# Patient Record
Sex: MALE | Race: WHITE | NOT HISPANIC OR LATINO | Employment: OTHER | URBAN - METROPOLITAN AREA
[De-identification: names, ages, dates, MRNs, and addresses within clinical notes are randomized per-mention and may not be internally consistent; named-entity substitution may affect disease eponyms.]

---

## 2017-02-16 ENCOUNTER — COMMUNICATION - HEALTHEAST (OUTPATIENT)
Dept: LAB | Facility: CLINIC | Age: 57
End: 2017-02-16

## 2017-02-16 DIAGNOSIS — C73 PAPILLARY THYROID CARCINOMA (H): ICD-10-CM

## 2017-03-13 ENCOUNTER — AMBULATORY - HEALTHEAST (OUTPATIENT)
Dept: LAB | Facility: CLINIC | Age: 57
End: 2017-03-13

## 2017-03-13 DIAGNOSIS — C73 PAPILLARY THYROID CARCINOMA (H): ICD-10-CM

## 2017-03-20 ENCOUNTER — COMMUNICATION - HEALTHEAST (OUTPATIENT)
Dept: ENDOCRINOLOGY | Facility: CLINIC | Age: 57
End: 2017-03-20

## 2017-06-06 ENCOUNTER — AMBULATORY - HEALTHEAST (OUTPATIENT)
Dept: LAB | Facility: CLINIC | Age: 57
End: 2017-06-06

## 2017-06-06 DIAGNOSIS — C73 PAPILLARY THYROID CARCINOMA (H): ICD-10-CM

## 2017-06-13 ENCOUNTER — OFFICE VISIT - HEALTHEAST (OUTPATIENT)
Dept: ENDOCRINOLOGY | Facility: CLINIC | Age: 57
End: 2017-06-13

## 2017-06-13 DIAGNOSIS — C73 THYROID CANCER (H): ICD-10-CM

## 2017-06-13 ASSESSMENT — MIFFLIN-ST. JEOR: SCORE: 1650.41

## 2017-06-22 ENCOUNTER — HOSPITAL ENCOUNTER (OUTPATIENT)
Dept: ULTRASOUND IMAGING | Facility: HOSPITAL | Age: 57
Discharge: HOME | End: 2017-06-22
Attending: INTERNAL MEDICINE

## 2017-06-22 DIAGNOSIS — C73 THYROID CANCER (H): ICD-10-CM

## 2017-06-26 ENCOUNTER — COMMUNICATION - HEALTHEAST (OUTPATIENT)
Dept: ENDOCRINOLOGY | Facility: CLINIC | Age: 57
End: 2017-06-26

## 2017-06-26 DIAGNOSIS — E03.8 SECONDARY HYPOTHYROIDISM: ICD-10-CM

## 2017-11-29 ENCOUNTER — COMMUNICATION - HEALTHEAST (OUTPATIENT)
Dept: INTERNAL MEDICINE | Facility: CLINIC | Age: 57
End: 2017-11-29

## 2017-12-20 ENCOUNTER — COMMUNICATION - HEALTHEAST (OUTPATIENT)
Dept: ENDOCRINOLOGY | Facility: CLINIC | Age: 57
End: 2017-12-20

## 2017-12-20 DIAGNOSIS — E03.8 SECONDARY HYPOTHYROIDISM: ICD-10-CM

## 2018-01-04 ENCOUNTER — OFFICE VISIT - HEALTHEAST (OUTPATIENT)
Dept: INTERNAL MEDICINE | Facility: CLINIC | Age: 58
End: 2018-01-04

## 2018-01-04 DIAGNOSIS — Z00.00 ROUTINE GENERAL MEDICAL EXAMINATION AT A HEALTH CARE FACILITY: ICD-10-CM

## 2018-01-04 DIAGNOSIS — E78.5 HYPERLIPIDEMIA: ICD-10-CM

## 2018-01-04 DIAGNOSIS — C73 PAPILLARY THYROID CARCINOMA (H): ICD-10-CM

## 2018-01-04 DIAGNOSIS — E03.8 SECONDARY HYPOTHYROIDISM: ICD-10-CM

## 2018-01-04 DIAGNOSIS — R41.3 MEMORY CHANGES: ICD-10-CM

## 2018-01-04 DIAGNOSIS — R06.83 SNORING: ICD-10-CM

## 2018-01-04 DIAGNOSIS — E11.9 TYPE 2 DIABETES MELLITUS (H): ICD-10-CM

## 2018-01-04 LAB
ALBUMIN SERPL-MCNC: 4.3 G/DL (ref 3.5–5)
ALP SERPL-CCNC: 77 U/L (ref 45–120)
ALT SERPL W P-5'-P-CCNC: 23 U/L (ref 0–45)
ANION GAP SERPL CALCULATED.3IONS-SCNC: 13 MMOL/L (ref 5–18)
AST SERPL W P-5'-P-CCNC: 18 U/L (ref 0–40)
BILIRUB DIRECT SERPL-MCNC: 0.2 MG/DL
BILIRUB SERPL-MCNC: 0.7 MG/DL (ref 0–1)
BUN SERPL-MCNC: 15 MG/DL (ref 8–22)
CALCIUM SERPL-MCNC: 9.5 MG/DL (ref 8.5–10.5)
CHLORIDE BLD-SCNC: 101 MMOL/L (ref 98–107)
CHOLEST SERPL-MCNC: 208 MG/DL
CO2 SERPL-SCNC: 24 MMOL/L (ref 22–31)
CREAT SERPL-MCNC: 0.94 MG/DL (ref 0.7–1.3)
CREAT UR-MCNC: 50.8 MG/DL
FASTING STATUS PATIENT QL REPORTED: YES
GFR SERPL CREATININE-BSD FRML MDRD: >60 ML/MIN/1.73M2
GLUCOSE BLD-MCNC: 166 MG/DL (ref 70–125)
HBA1C MFR BLD: 7.6 % (ref 3.5–6)
HDLC SERPL-MCNC: 49 MG/DL
HGB BLD-MCNC: 14.8 G/DL (ref 14–18)
LDLC SERPL CALC-MCNC: 125 MG/DL
MICROALBUMIN UR-MCNC: <0.5 MG/DL (ref 0–1.99)
MICROALBUMIN/CREAT UR: NORMAL MG/G
POTASSIUM BLD-SCNC: 4 MMOL/L (ref 3.5–5)
PROT SERPL-MCNC: 7.7 G/DL (ref 6–8)
PSA SERPL-MCNC: 2.9 NG/ML (ref 0–3.5)
SODIUM SERPL-SCNC: 138 MMOL/L (ref 136–145)
TRIGL SERPL-MCNC: 170 MG/DL
VIT B12 SERPL-MCNC: 303 PG/ML (ref 213–816)

## 2018-01-04 ASSESSMENT — MIFFLIN-ST. JEOR: SCORE: 1632.26

## 2018-01-05 LAB — HCV AB SERPL QL IA: NEGATIVE

## 2018-01-16 ENCOUNTER — COMMUNICATION - HEALTHEAST (OUTPATIENT)
Dept: ENDOCRINOLOGY | Facility: CLINIC | Age: 58
End: 2018-01-16

## 2018-01-17 ENCOUNTER — COMMUNICATION - HEALTHEAST (OUTPATIENT)
Dept: ENDOCRINOLOGY | Facility: CLINIC | Age: 58
End: 2018-01-17

## 2018-01-17 DIAGNOSIS — E03.8 SECONDARY HYPOTHYROIDISM: ICD-10-CM

## 2018-02-01 ENCOUNTER — COMMUNICATION - HEALTHEAST (OUTPATIENT)
Dept: INTERNAL MEDICINE | Facility: CLINIC | Age: 58
End: 2018-02-01

## 2018-03-13 ENCOUNTER — COMMUNICATION - HEALTHEAST (OUTPATIENT)
Dept: INTERNAL MEDICINE | Facility: CLINIC | Age: 58
End: 2018-03-13

## 2018-03-13 DIAGNOSIS — E78.5 HYPERLIPIDEMIA: ICD-10-CM

## 2018-06-11 ENCOUNTER — COMMUNICATION - HEALTHEAST (OUTPATIENT)
Dept: INTERNAL MEDICINE | Facility: CLINIC | Age: 58
End: 2018-06-11

## 2018-06-11 DIAGNOSIS — E78.5 HYPERLIPIDEMIA: ICD-10-CM

## 2018-06-27 ENCOUNTER — COMMUNICATION - HEALTHEAST (OUTPATIENT)
Dept: INTERNAL MEDICINE | Facility: CLINIC | Age: 58
End: 2018-06-27

## 2018-07-08 ENCOUNTER — AMBULATORY - HEALTHEAST (OUTPATIENT)
Dept: INTERNAL MEDICINE | Facility: CLINIC | Age: 58
End: 2018-07-08

## 2018-07-08 DIAGNOSIS — E11.9 TYPE 2 DIABETES MELLITUS (H): ICD-10-CM

## 2018-07-31 ENCOUNTER — COMMUNICATION - HEALTHEAST (OUTPATIENT)
Dept: ENDOCRINOLOGY | Facility: CLINIC | Age: 58
End: 2018-07-31

## 2018-07-31 DIAGNOSIS — E03.8 SECONDARY HYPOTHYROIDISM: ICD-10-CM

## 2018-08-07 ENCOUNTER — AMBULATORY - HEALTHEAST (OUTPATIENT)
Dept: ENDOCRINOLOGY | Facility: CLINIC | Age: 58
End: 2018-08-07

## 2018-08-07 DIAGNOSIS — E03.8 SECONDARY HYPOTHYROIDISM: ICD-10-CM

## 2018-08-07 DIAGNOSIS — E11.9 TYPE 2 DIABETES MELLITUS (H): ICD-10-CM

## 2018-08-21 ENCOUNTER — AMBULATORY - HEALTHEAST (OUTPATIENT)
Dept: LAB | Facility: CLINIC | Age: 58
End: 2018-08-21

## 2018-08-21 DIAGNOSIS — E11.9 TYPE 2 DIABETES MELLITUS (H): ICD-10-CM

## 2018-08-21 DIAGNOSIS — E03.8 SECONDARY HYPOTHYROIDISM: ICD-10-CM

## 2018-08-21 LAB
CALCIUM SERPL-MCNC: 10.1 MG/DL (ref 8.5–10.5)
CREAT SERPL-MCNC: 1.02 MG/DL (ref 0.7–1.3)
GFR SERPL CREATININE-BSD FRML MDRD: >60 ML/MIN/1.73M2
HBA1C MFR BLD: 7.3 % (ref 3.5–6)
POTASSIUM BLD-SCNC: 4.4 MMOL/L (ref 3.5–5)
T3 SERPL-MCNC: 60 NG/DL (ref 45–175)
T4 FREE SERPL-MCNC: 1.3 NG/DL (ref 0.7–1.8)
TSH SERPL DL<=0.005 MIU/L-ACNC: 0.2 UIU/ML (ref 0.3–5)

## 2018-08-22 ENCOUNTER — AMBULATORY - HEALTHEAST (OUTPATIENT)
Dept: INTERNAL MEDICINE | Facility: CLINIC | Age: 58
End: 2018-08-22

## 2018-08-22 LAB
25(OH)D3 SERPL-MCNC: 30.6 NG/ML (ref 30–80)
25(OH)D3 SERPL-MCNC: 30.6 NG/ML (ref 30–80)
THYROGLOB AB SERPL-ACNC: <0.9 IU/ML (ref 0–4)
THYROGLOB SERPL-MCNC: ABNORMAL NG/ML (ref 1.3–31.8)
THYROGLOBULIN, SERUM OR: <0.1 NG/ML (ref 1.3–31.8)

## 2018-08-28 ENCOUNTER — OFFICE VISIT - HEALTHEAST (OUTPATIENT)
Dept: ENDOCRINOLOGY | Facility: CLINIC | Age: 58
End: 2018-08-28

## 2018-08-28 DIAGNOSIS — E11.9 TYPE 2 DIABETES MELLITUS (H): ICD-10-CM

## 2018-08-28 DIAGNOSIS — E03.8 SECONDARY HYPOTHYROIDISM: ICD-10-CM

## 2018-08-28 ASSESSMENT — MIFFLIN-ST. JEOR: SCORE: 1605.05

## 2018-11-02 ENCOUNTER — COMMUNICATION - HEALTHEAST (OUTPATIENT)
Dept: ENDOCRINOLOGY | Facility: CLINIC | Age: 58
End: 2018-11-02

## 2018-11-02 DIAGNOSIS — E03.8 SECONDARY HYPOTHYROIDISM: ICD-10-CM

## 2018-12-15 ENCOUNTER — COMMUNICATION - HEALTHEAST (OUTPATIENT)
Dept: INTERNAL MEDICINE | Facility: CLINIC | Age: 58
End: 2018-12-15

## 2018-12-15 DIAGNOSIS — E78.5 HYPERLIPIDEMIA: ICD-10-CM

## 2019-01-29 ENCOUNTER — AMBULATORY - HEALTHEAST (OUTPATIENT)
Dept: ENDOCRINOLOGY | Facility: CLINIC | Age: 59
End: 2019-01-29

## 2019-01-29 DIAGNOSIS — E03.8 SECONDARY HYPOTHYROIDISM: ICD-10-CM

## 2019-01-29 DIAGNOSIS — E11.9 TYPE 2 DIABETES MELLITUS (H): ICD-10-CM

## 2019-01-31 ENCOUNTER — COMMUNICATION - HEALTHEAST (OUTPATIENT)
Dept: ENDOCRINOLOGY | Facility: CLINIC | Age: 59
End: 2019-01-31

## 2019-01-31 DIAGNOSIS — E03.8 SECONDARY HYPOTHYROIDISM: ICD-10-CM

## 2019-02-21 ENCOUNTER — AMBULATORY - HEALTHEAST (OUTPATIENT)
Dept: LAB | Facility: CLINIC | Age: 59
End: 2019-02-21

## 2019-02-21 DIAGNOSIS — E03.8 SECONDARY HYPOTHYROIDISM: ICD-10-CM

## 2019-02-21 LAB
CREAT SERPL-MCNC: 0.91 MG/DL (ref 0.7–1.3)
GFR SERPL CREATININE-BSD FRML MDRD: >60 ML/MIN/1.73M2
POTASSIUM BLD-SCNC: 4.2 MMOL/L (ref 3.5–5)
T4 FREE SERPL-MCNC: 1.4 NG/DL (ref 0.7–1.8)
TSH SERPL DL<=0.005 MIU/L-ACNC: 0.12 UIU/ML (ref 0.3–5)

## 2019-02-23 LAB
THYROGLOB AB SERPL-ACNC: <0.9 IU/ML (ref 0–4)
THYROGLOB SERPL-MCNC: ABNORMAL NG/ML (ref 1.3–31.8)
THYROGLOBULIN, SERUM OR: <0.1 NG/ML (ref 1.3–31.8)

## 2019-02-28 ENCOUNTER — COMMUNICATION - HEALTHEAST (OUTPATIENT)
Dept: ADMINISTRATIVE | Facility: CLINIC | Age: 59
End: 2019-02-28

## 2019-03-15 ENCOUNTER — COMMUNICATION - HEALTHEAST (OUTPATIENT)
Dept: INTERNAL MEDICINE | Facility: CLINIC | Age: 59
End: 2019-03-15

## 2019-03-15 DIAGNOSIS — E78.5 HYPERLIPIDEMIA: ICD-10-CM

## 2019-03-29 ENCOUNTER — OFFICE VISIT - HEALTHEAST (OUTPATIENT)
Dept: INTERNAL MEDICINE | Facility: CLINIC | Age: 59
End: 2019-03-29

## 2019-03-29 DIAGNOSIS — E11.9 TYPE 2 DIABETES MELLITUS WITHOUT COMPLICATION, WITHOUT LONG-TERM CURRENT USE OF INSULIN (H): ICD-10-CM

## 2019-03-29 DIAGNOSIS — Z00.00 ROUTINE GENERAL MEDICAL EXAMINATION AT A HEALTH CARE FACILITY: ICD-10-CM

## 2019-03-29 DIAGNOSIS — R06.83 SNORING: ICD-10-CM

## 2019-03-29 DIAGNOSIS — C73 PAPILLARY THYROID CARCINOMA (H): ICD-10-CM

## 2019-03-29 DIAGNOSIS — E03.8 SECONDARY HYPOTHYROIDISM: ICD-10-CM

## 2019-03-29 DIAGNOSIS — R41.3 MEMORY CHANGES: ICD-10-CM

## 2019-03-29 DIAGNOSIS — E78.5 HYPERLIPIDEMIA, UNSPECIFIED HYPERLIPIDEMIA TYPE: ICD-10-CM

## 2019-03-29 DIAGNOSIS — G47.19 DAYTIME HYPERSOMNOLENCE: ICD-10-CM

## 2019-03-29 DIAGNOSIS — Z83.719 FAMILY HISTORY OF COLONIC POLYPS: ICD-10-CM

## 2019-03-29 DIAGNOSIS — Z12.5 SCREENING FOR PROSTATE CANCER: ICD-10-CM

## 2019-03-29 DIAGNOSIS — Z12.11 ENCOUNTER FOR COLORECTAL CANCER SCREENING: ICD-10-CM

## 2019-03-29 DIAGNOSIS — Z12.12 ENCOUNTER FOR COLORECTAL CANCER SCREENING: ICD-10-CM

## 2019-03-29 DIAGNOSIS — E55.9 VITAMIN D DEFICIENCY: ICD-10-CM

## 2019-03-29 DIAGNOSIS — Z51.81 MEDICATION MONITORING ENCOUNTER: ICD-10-CM

## 2019-03-29 LAB
ALBUMIN SERPL-MCNC: 4.5 G/DL (ref 3.5–5)
ALBUMIN UR-MCNC: NEGATIVE MG/DL
ALP SERPL-CCNC: 64 U/L (ref 45–120)
ALT SERPL W P-5'-P-CCNC: 17 U/L (ref 0–45)
ANION GAP SERPL CALCULATED.3IONS-SCNC: 10 MMOL/L (ref 5–18)
APPEARANCE UR: CLEAR
AST SERPL W P-5'-P-CCNC: 17 U/L (ref 0–40)
BILIRUB DIRECT SERPL-MCNC: 0.2 MG/DL
BILIRUB SERPL-MCNC: 0.6 MG/DL (ref 0–1)
BILIRUB UR QL STRIP: NEGATIVE
BUN SERPL-MCNC: 15 MG/DL (ref 8–22)
CALCIUM SERPL-MCNC: 9.4 MG/DL (ref 8.5–10.5)
CHLORIDE BLD-SCNC: 105 MMOL/L (ref 98–107)
CHOLEST SERPL-MCNC: 169 MG/DL
CO2 SERPL-SCNC: 26 MMOL/L (ref 22–31)
COLOR UR AUTO: YELLOW
CREAT SERPL-MCNC: 0.89 MG/DL (ref 0.7–1.3)
CREAT UR-MCNC: 146.7 MG/DL
FASTING STATUS PATIENT QL REPORTED: YES
GFR SERPL CREATININE-BSD FRML MDRD: >60 ML/MIN/1.73M2
GLUCOSE BLD-MCNC: 138 MG/DL (ref 70–125)
GLUCOSE UR STRIP-MCNC: ABNORMAL MG/DL
HBA1C MFR BLD: 6.5 % (ref 3.5–6)
HDLC SERPL-MCNC: 47 MG/DL
HGB BLD-MCNC: 14.8 G/DL (ref 14–18)
HGB UR QL STRIP: NEGATIVE
KETONES UR STRIP-MCNC: NEGATIVE MG/DL
LDLC SERPL CALC-MCNC: 97 MG/DL
LEUKOCYTE ESTERASE UR QL STRIP: NEGATIVE
MICROALBUMIN UR-MCNC: 0.84 MG/DL (ref 0–1.99)
MICROALBUMIN/CREAT UR: 5.7 MG/G
NITRATE UR QL: NEGATIVE
PH UR STRIP: 6.5 [PH] (ref 4.5–8)
POTASSIUM BLD-SCNC: 4.6 MMOL/L (ref 3.5–5)
PROT SERPL-MCNC: 7 G/DL (ref 6–8)
PSA SERPL-MCNC: 1.9 NG/ML (ref 0–3.5)
SODIUM SERPL-SCNC: 141 MMOL/L (ref 136–145)
SP GR UR STRIP: 1.02 (ref 1–1.03)
T4 TOTAL - HISTORICAL: 9.5 UG/DL (ref 4.5–13)
TRIGL SERPL-MCNC: 124 MG/DL
TSH SERPL DL<=0.005 MIU/L-ACNC: 0.19 UIU/ML (ref 0.3–5)
UROBILINOGEN UR STRIP-ACNC: ABNORMAL

## 2019-03-29 ASSESSMENT — MIFFLIN-ST. JEOR: SCORE: 1605.05

## 2019-04-01 ENCOUNTER — AMBULATORY - HEALTHEAST (OUTPATIENT)
Dept: INTERNAL MEDICINE | Facility: CLINIC | Age: 59
End: 2019-04-01

## 2019-04-01 DIAGNOSIS — E55.9 VITAMIN D DEFICIENCY: ICD-10-CM

## 2019-04-01 LAB
25(OH)D3 SERPL-MCNC: 19.3 NG/ML (ref 30–80)
25(OH)D3 SERPL-MCNC: 19.3 NG/ML (ref 30–80)

## 2019-04-25 ENCOUNTER — COMMUNICATION - HEALTHEAST (OUTPATIENT)
Dept: ENDOCRINOLOGY | Facility: CLINIC | Age: 59
End: 2019-04-25

## 2019-04-25 DIAGNOSIS — E03.8 SECONDARY HYPOTHYROIDISM: ICD-10-CM

## 2019-05-07 ENCOUNTER — TRANSFERRED RECORDS (OUTPATIENT)
Dept: HEALTH INFORMATION MANAGEMENT | Facility: CLINIC | Age: 59
End: 2019-05-07

## 2019-05-08 ENCOUNTER — TRANSFERRED RECORDS (OUTPATIENT)
Dept: HEALTH INFORMATION MANAGEMENT | Facility: CLINIC | Age: 59
End: 2019-05-08

## 2019-07-22 ENCOUNTER — COMMUNICATION - HEALTHEAST (OUTPATIENT)
Dept: ENDOCRINOLOGY | Facility: CLINIC | Age: 59
End: 2019-07-22

## 2019-07-22 DIAGNOSIS — E03.8 SECONDARY HYPOTHYROIDISM: ICD-10-CM

## 2019-08-24 ENCOUNTER — COMMUNICATION - HEALTHEAST (OUTPATIENT)
Dept: INTERNAL MEDICINE | Facility: CLINIC | Age: 59
End: 2019-08-24

## 2019-08-24 DIAGNOSIS — E11.9 TYPE 2 DIABETES MELLITUS WITHOUT COMPLICATION, WITHOUT LONG-TERM CURRENT USE OF INSULIN (H): ICD-10-CM

## 2020-01-20 ENCOUNTER — RECORDS - HEALTHEAST (OUTPATIENT)
Dept: ADMINISTRATIVE | Facility: OTHER | Age: 60
End: 2020-01-20

## 2020-02-04 ENCOUNTER — ANCILLARY PROCEDURE (OUTPATIENT)
Dept: ULTRASOUND IMAGING | Facility: CLINIC | Age: 60
End: 2020-02-04
Attending: INTERNAL MEDICINE
Payer: COMMERCIAL

## 2020-02-04 ENCOUNTER — RECORDS - HEALTHEAST (OUTPATIENT)
Dept: ADMINISTRATIVE | Facility: OTHER | Age: 60
End: 2020-02-04

## 2020-02-04 ENCOUNTER — OFFICE VISIT (OUTPATIENT)
Dept: ENDOCRINOLOGY | Facility: CLINIC | Age: 60
End: 2020-02-04
Payer: COMMERCIAL

## 2020-02-04 VITALS
WEIGHT: 184.8 LBS | SYSTOLIC BLOOD PRESSURE: 126 MMHG | HEART RATE: 78 BPM | OXYGEN SATURATION: 99 % | DIASTOLIC BLOOD PRESSURE: 86 MMHG

## 2020-02-04 DIAGNOSIS — C73 PAPILLARY THYROID CARCINOMA (H): ICD-10-CM

## 2020-02-04 DIAGNOSIS — C73 PAPILLARY THYROID CARCINOMA (H): Primary | ICD-10-CM

## 2020-02-04 LAB
T4 FREE SERPL-MCNC: 1.33 NG/DL (ref 0.76–1.46)
TSH SERPL DL<=0.005 MIU/L-ACNC: 0.11 MU/L (ref 0.4–4)

## 2020-02-04 PROCEDURE — 84443 ASSAY THYROID STIM HORMONE: CPT | Performed by: INTERNAL MEDICINE

## 2020-02-04 PROCEDURE — 36415 COLL VENOUS BLD VENIPUNCTURE: CPT | Performed by: INTERNAL MEDICINE

## 2020-02-04 PROCEDURE — 99204 OFFICE O/P NEW MOD 45 MIN: CPT | Performed by: INTERNAL MEDICINE

## 2020-02-04 PROCEDURE — 84432 ASSAY OF THYROGLOBULIN: CPT | Performed by: INTERNAL MEDICINE

## 2020-02-04 PROCEDURE — 84439 ASSAY OF FREE THYROXINE: CPT | Performed by: INTERNAL MEDICINE

## 2020-02-04 PROCEDURE — 76536 US EXAM OF HEAD AND NECK: CPT

## 2020-02-04 PROCEDURE — 86800 THYROGLOBULIN ANTIBODY: CPT | Performed by: INTERNAL MEDICINE

## 2020-02-04 RX ORDER — ASPIRIN 81 MG/1
81 TABLET ORAL
COMMUNITY

## 2020-02-04 RX ORDER — ROSUVASTATIN CALCIUM 10 MG/1
TABLET, COATED ORAL
COMMUNITY
Start: 2019-03-18 | End: 2021-08-17

## 2020-02-04 RX ORDER — LEVOTHYROXINE SODIUM 137 UG/1
TABLET ORAL
COMMUNITY
Start: 2019-07-22 | End: 2021-07-26

## 2020-02-04 RX ORDER — METFORMIN HCL 500 MG
500 TABLET, EXTENDED RELEASE 24 HR ORAL
COMMUNITY
Start: 2019-08-24 | End: 2021-08-03

## 2020-02-04 NOTE — PROGRESS NOTES
CC: Thyroid cancer.       HPI: Patient presents for management of thyroid cancer.   His primary care noticed a lump on exam in 2010.     FNA 4/1/10:  DIAGNOSIS:  A. RIGHT NECK MASS, FINE NEEDLE ASPIRATION:       1) BLOOD AND MACROPHAGES, RARE DEGENERATED CELLS       2) NO TUMOR SEEN       3) PLEASE SEE COMMENT.    B. CYST FLUID FROM RIGHT NECK MASS, CELL BLOCK:       1) BLOOD AND MACROPHAGES       2) NO TUMOR SEEN       3) PLEASE SEE COMMENT.    COMMENT:  Evaluation of this sample is limited, since diagnostic cyst  lining cells are not identified. The macrophages identified are  compatible with benign cyst degeneration.  However, the  possibility of an underlying neoplastic process is not entirely  excluded. Recommend clinical correlation and close follow-up. If  clinically warranted, additional sampling (e.g., open biopsy) of  this lesion near the carotid may provide additional information.    Surgery 5/14/10:  MICRO/DIAGNOSIS:  RIGHT NECK MASS, EXCISIONS IN TWO PARTS       -    LYMPH NODE TISSUE, POSITIVE FOR PAPILLARY THYROID            CARCINOMA IN BOTH SPECIMENS (2/2).    Surgery 5/21/10:  MICRO/DIAGNOSIS:  A) THYROID GLAND, RIGHT LOBE, LOBECTOMY:     DOMINANT TUMOR:       - TUMOR LATERALITY: RIGHT       - TUMOR SIZE: 1.2 CM IN GREATEST DIMENSION       - HISTOLOGIC TYPE: PAPILLARY CARCINOMA, CLASSICAL TYPE       - HISTOLOGIC GRADE: GRADE 1, WELL DIFFERENTIATED       - MARGINS: NEGATIVE FOR TUMOR, 0.1 MM TO CLOSEST INKED         CAPSULAR SURFACE MARGIN       - TUMOR CAPSULE: PARTIALLY ENCAPSULATED       - TUMOR CAPSULE INVASION: NOT IDENTIFIED       - LYMPH-VASCULAR INVASION: NOT IDENTIFIED       - PERINEURAL INVASION: NOT IDENTIFIED       - EXTRATHYROIDAL EXTENSION: NOT IDENTIFIED       SECOND TUMOR: NOT APPLICABLE       TUMOR FOCALITY: UNIFOCAL       RECEIVED: IN FORMALIN       SPECIMEN INTEGRITY: RECEIVED INTACT       SPECIMEN SIZE: SEE GROSS DESCRIPTION BELOW       SPECIMEN WEIGHT: NOT OBTAINED BY  "PROSECTOR       ADDITIONAL PATHOLOGIC FINDINGS: NONE       ANCILLARY STUDIES: NONE       CLINICAL HISTORY: AS PROVIDED BELOW       PATHOLOGIC STAGING (pTNM):       - TNM DESCRIPTORS: NOT APPLICABLE       - PRIMARY TUMOR: PT1b       - REGIONAL LYMPH NODES: TWO POSITIVE LYMPH NODES FROM PRIOR         SPECIMEN (010-1058) (2/2) AND FOUR POSITIVE LYMPH NODES OF         36 LYMPH NODES IN CURRENT TISSUES, FOUR LYMPH NODES         INVOLVED, 36 EXAMINED, PLUS 2/2 FROM ([id number]): THUS         6 POSITIVE NODES AND 38 EXAMINED WHEN INCLUDING ([id number])         pN1b       - LYMPH NODE EXTRANODAL EXTENSION: NOT IDENTIFIED       - DISTANT METASTASIS: NOT APPLICABLE.    B) THYROID GLAND, LEFT LOBE, LOBECTOMY       - BENIGN THYROID GLAND, NEGATIVE FOR TUMOR.    C) CENTRAL COMPARTMENT LYMPH NODES, EXCISION       - TWO OF THREE LYMPH NODES POSITIVE FOR THYROID CARCINOMA         (2/3)       - ASSOCIATED BENIGN THYROID TISSUE AS WELL IS HISTOLOGICALLY         IDENTIFIED.    D) MIDCERVICAL \"NODE,\" BIOPSY       - MATURE FIBROFATTY TISSUE, NEGATIVE FOR TUMOR AND NEGATIVE         FOR LYMPH NODE.    E) CERVICAL NODE, REGIONAL DISSECTION       - ONE OF NINE LYMPH NODES POSITIVE FOR PAPILLARY THYROID         CARCINOMA (1/9)       - ASSOCIATED BENIGN THYMIC TISSUE WITH FOCAL SMALL CYST.    F) RIGHT MODIFIED NECK RESECTION       - 24 LYMPH NODES IDENTIFIED, ONE POSITIVE FOR PAPILLARY         CARCINOMA (1/24).    He did receive I 131 on 7/22/2010 but the dose is not listed.     FNA left LN from 5/15/12:  LEFT NECK LYMPH NODE, ULTRASOUND-GUIDED FINE NEEDLE ASPIRATION       -    MIXED LYMPHOID INFILTRATE.       -    NO EVIDENCE OF MALIGNANCY.    He is currently taking 137 mcg of levothyroxine daily.   No palpitations, sleep disturbance, tremors.     ROS: 10 point ROS neg other than the symptoms noted above in the HPI.    PMH:   Vitamin D deficiency  Type 2 DM  Dyslipidemia   Hypothyroidism  Papillary thyroid cancer     Meds:  Current Outpatient " Medications   Medication     cholecalciferol (VITAMIN D-1000 MAX ST) 25 MCG (1000 UT) TABS     levothyroxine (SYNTHROID/LEVOTHROID) 137 MCG tablet     metFORMIN (GLUCOPHAGE-XR) 500 MG 24 hr tablet     rosuvastatin (CRESTOR) 10 MG tablet     aspirin 81 MG EC tablet     No current facility-administered medications for this visit.      FHX:   No thyroid disease.     SHX:  Non-smoker.   Retired from lumbar business.     Exam:   Vital signs:      BP: 126/86 Pulse: 78     SpO2: 99 %       Weight: 83.8 kg (184 lb 12.8 oz)  There is no height or weight on file to calculate BMI.  Gen: In NAD.   HEENT: no proptosis or lid lag, EOMI, no palpable thyroid tissue. No LAD.   Card: S1 S2 RRR no m/r/g. no LE edema.   Pulm: CTA b/l.   GI: NT ND +BS.   MSK: no gross deformities.   Derm: no rashes or lesions.   Neuro: no tremor, +2 DTR's.     A/P:   Papillary thyroid cancer - pT1b pN1b. Surgery on 5/14/10 and 5/21/10. I 131 in 7/2010. Discussed natural history and management of thyroid cancer.   TSH - 0.19 in 3/2019. No symptoms of hyperthyroidism.   -Repeat today.   Tg - Negative with negative antibodies since 2010.  -Repeat today.    US -normal in 6/2017.   -Schedule thyroid ultrasound.   -If normal, we will space these out to every 5 years.   -Call [place] radiology scheduling for your procedure:  For scheduling in the [place] ([place], [facility], and [place]) call [phone] or [phone]      For scheduling at [facility] ([facility], [facility] [place]), call [phone] or [phone]      For scheduling in the [place] ([facility], [facility]) call [phone]  or  [phone]        Tonny Basurto MD on 2/4/2020 at 10:49 AM

## 2020-02-04 NOTE — LETTER
2/4/2020         RE: Waldemar Chua  [address] [address]        Dear Colleague,    Thank you for referring your patient, Waldemar Chua, to the [facility]. Please see a copy of my visit note below.    CC: Thyroid cancer.       HPI: Patient presents for management of thyroid cancer.   His primary care noticed a lump on exam in 2010.     FNA 4/1/10:  DIAGNOSIS:  A. RIGHT NECK MASS, FINE NEEDLE ASPIRATION:       1) BLOOD AND MACROPHAGES, RARE DEGENERATED CELLS       2) NO TUMOR SEEN       3) PLEASE SEE COMMENT.    B. CYST FLUID FROM RIGHT NECK MASS, CELL BLOCK:       1) BLOOD AND MACROPHAGES       2) NO TUMOR SEEN       3) PLEASE SEE COMMENT.    COMMENT:  Evaluation of this sample is limited, since diagnostic cyst  lining cells are not identified. The macrophages identified are  compatible with benign cyst degeneration.  However, the  possibility of an underlying neoplastic process is not entirely  excluded. Recommend clinical correlation and close follow-up. If  clinically warranted, additional sampling (e.g., open biopsy) of  this lesion near the carotid may provide additional information.    Surgery 5/14/10:  MICRO/DIAGNOSIS:  RIGHT NECK MASS, EXCISIONS IN TWO PARTS       -    LYMPH NODE TISSUE, POSITIVE FOR PAPILLARY THYROID            CARCINOMA IN BOTH SPECIMENS (2/2).    Surgery 5/21/10:  MICRO/DIAGNOSIS:  A) THYROID GLAND, RIGHT LOBE, LOBECTOMY:     DOMINANT TUMOR:       - TUMOR LATERALITY: RIGHT       - TUMOR SIZE: 1.2 CM IN GREATEST DIMENSION       - HISTOLOGIC TYPE: PAPILLARY CARCINOMA, CLASSICAL TYPE       - HISTOLOGIC GRADE: GRADE 1, WELL DIFFERENTIATED       - MARGINS: NEGATIVE FOR TUMOR, 0.1 MM TO CLOSEST INKED         CAPSULAR SURFACE MARGIN       - TUMOR CAPSULE: PARTIALLY ENCAPSULATED       - TUMOR CAPSULE INVASION: NOT IDENTIFIED       - LYMPH-VASCULAR INVASION: NOT IDENTIFIED       - PERINEURAL INVASION: NOT IDENTIFIED       - EXTRATHYROIDAL EXTENSION:  "NOT IDENTIFIED       SECOND TUMOR: NOT APPLICABLE       TUMOR FOCALITY: UNIFOCAL       RECEIVED: IN FORMALIN       SPECIMEN INTEGRITY: RECEIVED INTACT       SPECIMEN SIZE: SEE GROSS DESCRIPTION BELOW       SPECIMEN WEIGHT: NOT OBTAINED BY PROSECTOR       ADDITIONAL PATHOLOGIC FINDINGS: NONE       ANCILLARY STUDIES: NONE       CLINICAL HISTORY: AS PROVIDED BELOW       PATHOLOGIC STAGING (pTNM):       - TNM DESCRIPTORS: NOT APPLICABLE       - PRIMARY TUMOR: PT1b       - REGIONAL LYMPH NODES: TWO POSITIVE LYMPH NODES FROM PRIOR         SPECIMEN (010-1058) (2/2) AND FOUR POSITIVE LYMPH NODES OF         36 LYMPH NODES IN CURRENT TISSUES, FOUR LYMPH NODES         INVOLVED, 36 EXAMINED, PLUS 2/2 FROM ([id number]): THUS         6 POSITIVE NODES AND 38 EXAMINED WHEN INCLUDING ([id number])         pN1b       - LYMPH NODE EXTRANODAL EXTENSION: NOT IDENTIFIED       - DISTANT METASTASIS: NOT APPLICABLE.    B) THYROID GLAND, LEFT LOBE, LOBECTOMY       - BENIGN THYROID GLAND, NEGATIVE FOR TUMOR.    C) CENTRAL COMPARTMENT LYMPH NODES, EXCISION       - TWO OF THREE LYMPH NODES POSITIVE FOR THYROID CARCINOMA         (2/3)       - ASSOCIATED BENIGN THYROID TISSUE AS WELL IS HISTOLOGICALLY         IDENTIFIED.    D) MIDCERVICAL \"NODE,\" BIOPSY       - MATURE FIBROFATTY TISSUE, NEGATIVE FOR TUMOR AND NEGATIVE         FOR LYMPH NODE.    E) CERVICAL NODE, REGIONAL DISSECTION       - ONE OF NINE LYMPH NODES POSITIVE FOR PAPILLARY THYROID         CARCINOMA (1/9)       - ASSOCIATED BENIGN THYMIC TISSUE WITH FOCAL SMALL CYST.    F) RIGHT MODIFIED NECK RESECTION       - 24 LYMPH NODES IDENTIFIED, ONE POSITIVE FOR PAPILLARY         CARCINOMA (1/24).    He did receive I 131 on 7/22/2010 but the dose is not listed.     FNA left LN from 5/15/12:  LEFT NECK LYMPH NODE, ULTRASOUND-GUIDED FINE NEEDLE ASPIRATION       -    MIXED LYMPHOID INFILTRATE.       -    NO EVIDENCE OF MALIGNANCY.    He is currently taking 137 mcg of levothyroxine daily.   No " palpitations, sleep disturbance, tremors.     ROS: 10 point ROS neg other than the symptoms noted above in the HPI.    PMH:   Vitamin D deficiency  Type 2 DM  Dyslipidemia   Hypothyroidism  Papillary thyroid cancer     Meds:  Current Outpatient Medications   Medication     cholecalciferol (VITAMIN D-1000 MAX ST) 25 MCG (1000 UT) TABS     levothyroxine (SYNTHROID/LEVOTHROID) 137 MCG tablet     metFORMIN (GLUCOPHAGE-XR) 500 MG 24 hr tablet     rosuvastatin (CRESTOR) 10 MG tablet     aspirin 81 MG EC tablet     No current facility-administered medications for this visit.      FHX:   No thyroid disease.     SHX:  Non-smoker.   Retired from lumbar business.     Exam:   Vital signs:      BP: 126/86 Pulse: 78     SpO2: 99 %       Weight: 83.8 kg (184 lb 12.8 oz)  There is no height or weight on file to calculate BMI.  Gen: In NAD.   HEENT: no proptosis or lid lag, EOMI, no palpable thyroid tissue. No LAD.   Card: S1 S2 RRR no m/r/g. no LE edema.   Pulm: CTA b/l.   GI: NT ND +BS.   MSK: no gross deformities.   Derm: no rashes or lesions.   Neuro: no tremor, +2 DTR's.     A/P:   Papillary thyroid cancer - pT1b pN1b. Surgery on 5/14/10 and 5/21/10. I 131 in 7/2010. Discussed natural history and management of thyroid cancer.   TSH - 0.19 in 3/2019. No symptoms of hyperthyroidism.   -Repeat today.   Tg - Negative with negative antibodies since 2010.  -Repeat today.    US -normal in 6/2017.   -Schedule thyroid ultrasound.   -If normal, we will space these out to every 5 years.   -Call [place] radiology scheduling for your procedure:  For scheduling in the [place] ([place], [facility], and [place]) call [phone] or [phone]      For scheduling at [facility] ([facility], [facility], [place]), call [phone] or [phone]      For scheduling in the South ([facility], [facility]) call [phone]  or  [phone]        Tonny Basurto MD on  2/4/2020 at 10:49 AM          Again, thank you for allowing me to participate in the care of your patient.        Sincerely,        Tonny Basurto MD

## 2020-02-04 NOTE — PATIENT INSTRUCTIONS
-Labs today.   -Schedule thyroid ultrasound.   -If normal, we will space these out to every 5 years.   -Call [place] radiology scheduling for your procedure:  For scheduling in the [place] ([place], [facility], and [place]) call [phone] or [phone]      For scheduling at [facility] ([facility], [facility], [place]), call [phone] or [phone]      For scheduling in the South ([facility]) call [phone]  or  [phone]

## 2020-02-05 DIAGNOSIS — C73 PAPILLARY THYROID CARCINOMA (H): Primary | ICD-10-CM

## 2020-02-05 LAB
THYROGLOB AB SERPL IA-ACNC: <20 IU/ML (ref 0–40)
THYROGLOB SERPL-MCNC: <0.2 NG/ML

## 2020-03-03 ENCOUNTER — COMMUNICATION - HEALTHEAST (OUTPATIENT)
Dept: INTERNAL MEDICINE | Facility: CLINIC | Age: 60
End: 2020-03-03

## 2020-03-03 DIAGNOSIS — E78.5 HYPERLIPIDEMIA: ICD-10-CM

## 2020-03-03 DIAGNOSIS — E11.9 TYPE 2 DIABETES MELLITUS WITHOUT COMPLICATION, WITHOUT LONG-TERM CURRENT USE OF INSULIN (H): ICD-10-CM

## 2020-03-11 ENCOUNTER — HEALTH MAINTENANCE LETTER (OUTPATIENT)
Age: 60
End: 2020-03-11

## 2020-05-29 ENCOUNTER — COMMUNICATION - HEALTHEAST (OUTPATIENT)
Dept: INTERNAL MEDICINE | Facility: CLINIC | Age: 60
End: 2020-05-29

## 2020-05-29 DIAGNOSIS — E78.5 HYPERLIPIDEMIA: ICD-10-CM

## 2020-07-24 ENCOUNTER — COMMUNICATION - HEALTHEAST (OUTPATIENT)
Dept: INTERNAL MEDICINE | Facility: CLINIC | Age: 60
End: 2020-07-24

## 2020-07-24 ENCOUNTER — OFFICE VISIT - HEALTHEAST (OUTPATIENT)
Dept: INTERNAL MEDICINE | Facility: CLINIC | Age: 60
End: 2020-07-24

## 2020-07-24 ENCOUNTER — AMBULATORY - HEALTHEAST (OUTPATIENT)
Dept: INTERNAL MEDICINE | Facility: CLINIC | Age: 60
End: 2020-07-24

## 2020-07-24 DIAGNOSIS — Z00.00 ROUTINE GENERAL MEDICAL EXAMINATION AT A HEALTH CARE FACILITY: ICD-10-CM

## 2020-07-24 DIAGNOSIS — E78.5 HYPERLIPIDEMIA, UNSPECIFIED HYPERLIPIDEMIA TYPE: ICD-10-CM

## 2020-07-24 DIAGNOSIS — C73 PAPILLARY THYROID CARCINOMA (H): ICD-10-CM

## 2020-07-24 DIAGNOSIS — Z80.8 FAMILY HISTORY OF MELANOMA: ICD-10-CM

## 2020-07-24 DIAGNOSIS — Z86.0100 PERSONAL HISTORY OF COLONIC POLYPS: ICD-10-CM

## 2020-07-24 DIAGNOSIS — Z12.5 SCREENING FOR PROSTATE CANCER: ICD-10-CM

## 2020-07-24 DIAGNOSIS — E11.9 TYPE 2 DIABETES MELLITUS WITHOUT COMPLICATION, WITHOUT LONG-TERM CURRENT USE OF INSULIN (H): ICD-10-CM

## 2020-07-24 DIAGNOSIS — E55.9 VITAMIN D DEFICIENCY: ICD-10-CM

## 2020-07-24 DIAGNOSIS — M25.511 CHRONIC RIGHT SHOULDER PAIN: ICD-10-CM

## 2020-07-24 DIAGNOSIS — G89.29 CHRONIC RIGHT SHOULDER PAIN: ICD-10-CM

## 2020-07-24 DIAGNOSIS — E03.8 SECONDARY HYPOTHYROIDISM: ICD-10-CM

## 2020-07-24 LAB
ALBUMIN SERPL-MCNC: 4.5 G/DL (ref 3.5–5)
ALP SERPL-CCNC: 69 U/L (ref 45–120)
ALT SERPL W P-5'-P-CCNC: 14 U/L (ref 0–45)
ANION GAP SERPL CALCULATED.3IONS-SCNC: 13 MMOL/L (ref 5–18)
AST SERPL W P-5'-P-CCNC: 15 U/L (ref 0–40)
BILIRUB SERPL-MCNC: 0.5 MG/DL (ref 0–1)
BUN SERPL-MCNC: 14 MG/DL (ref 8–22)
CALCIUM SERPL-MCNC: 9.7 MG/DL (ref 8.5–10.5)
CHLORIDE BLD-SCNC: 101 MMOL/L (ref 98–107)
CHOLEST SERPL-MCNC: 201 MG/DL
CO2 SERPL-SCNC: 26 MMOL/L (ref 22–31)
CREAT SERPL-MCNC: 0.85 MG/DL (ref 0.7–1.3)
CREAT UR-MCNC: 67.8 MG/DL
ERYTHROCYTE [DISTWIDTH] IN BLOOD BY AUTOMATED COUNT: 11.9 % (ref 11–14.5)
FASTING STATUS PATIENT QL REPORTED: YES
GFR SERPL CREATININE-BSD FRML MDRD: >60 ML/MIN/1.73M2
GLUCOSE BLD-MCNC: 118 MG/DL (ref 70–125)
HBA1C MFR BLD: 6.7 % (ref 3.5–6)
HCT VFR BLD AUTO: 44.5 % (ref 40–54)
HDLC SERPL-MCNC: 54 MG/DL
HGB BLD-MCNC: 15.1 G/DL (ref 14–18)
LDLC SERPL CALC-MCNC: 110 MG/DL
MCH RBC QN AUTO: 29.3 PG (ref 27–34)
MCHC RBC AUTO-ENTMCNC: 33.8 G/DL (ref 32–36)
MCV RBC AUTO: 87 FL (ref 80–100)
MICROALBUMIN UR-MCNC: 0.51 MG/DL (ref 0–1.99)
MICROALBUMIN/CREAT UR: 7.5 MG/G
PLATELET # BLD AUTO: 218 THOU/UL (ref 140–440)
PMV BLD AUTO: 7.6 FL (ref 7–10)
POTASSIUM BLD-SCNC: 4.3 MMOL/L (ref 3.5–5)
PROT SERPL-MCNC: 7.4 G/DL (ref 6–8)
PSA SERPL-MCNC: 1.8 NG/ML (ref 0–4.5)
RBC # BLD AUTO: 5.14 MILL/UL (ref 4.4–6.2)
SODIUM SERPL-SCNC: 140 MMOL/L (ref 136–145)
TRIGL SERPL-MCNC: 184 MG/DL
WBC: 5.1 THOU/UL (ref 4–11)

## 2020-07-24 ASSESSMENT — MIFFLIN-ST. JEOR: SCORE: 1564.22

## 2020-07-27 ENCOUNTER — COMMUNICATION - HEALTHEAST (OUTPATIENT)
Dept: ENDOCRINOLOGY | Facility: CLINIC | Age: 60
End: 2020-07-27

## 2020-07-27 DIAGNOSIS — E03.8 SECONDARY HYPOTHYROIDISM: ICD-10-CM

## 2020-07-27 LAB
25(OH)D3 SERPL-MCNC: 35.7 NG/ML (ref 30–80)
25(OH)D3 SERPL-MCNC: 35.7 NG/ML (ref 30–80)

## 2020-07-28 ENCOUNTER — AMBULATORY - HEALTHEAST (OUTPATIENT)
Dept: INTERNAL MEDICINE | Facility: CLINIC | Age: 60
End: 2020-07-28

## 2020-07-28 DIAGNOSIS — E55.9 VITAMIN D DEFICIENCY: ICD-10-CM

## 2021-01-04 ENCOUNTER — HEALTH MAINTENANCE LETTER (OUTPATIENT)
Age: 61
End: 2021-01-04

## 2021-02-22 ENCOUNTER — COMMUNICATION - HEALTHEAST (OUTPATIENT)
Dept: INTERNAL MEDICINE | Facility: CLINIC | Age: 61
End: 2021-02-22

## 2021-02-22 DIAGNOSIS — E11.9 TYPE 2 DIABETES MELLITUS WITHOUT COMPLICATION, WITHOUT LONG-TERM CURRENT USE OF INSULIN (H): ICD-10-CM

## 2021-03-11 ENCOUNTER — COMMUNICATION - HEALTHEAST (OUTPATIENT)
Dept: INTERNAL MEDICINE | Facility: CLINIC | Age: 61
End: 2021-03-11

## 2021-03-16 ENCOUNTER — AMBULATORY - HEALTHEAST (OUTPATIENT)
Dept: NURSING | Facility: CLINIC | Age: 61
End: 2021-03-16

## 2021-04-06 ENCOUNTER — AMBULATORY - HEALTHEAST (OUTPATIENT)
Dept: NURSING | Facility: CLINIC | Age: 61
End: 2021-04-06

## 2021-04-25 ENCOUNTER — HEALTH MAINTENANCE LETTER (OUTPATIENT)
Age: 61
End: 2021-04-25

## 2021-05-04 ENCOUNTER — OFFICE VISIT (OUTPATIENT)
Dept: ENDOCRINOLOGY | Facility: CLINIC | Age: 61
End: 2021-05-04
Payer: COMMERCIAL

## 2021-05-04 VITALS
RESPIRATION RATE: 14 BRPM | DIASTOLIC BLOOD PRESSURE: 91 MMHG | HEART RATE: 77 BPM | SYSTOLIC BLOOD PRESSURE: 126 MMHG | WEIGHT: 173 LBS

## 2021-05-04 DIAGNOSIS — C73 PAPILLARY THYROID CARCINOMA (H): Primary | ICD-10-CM

## 2021-05-04 LAB
T4 FREE SERPL-MCNC: 1.28 NG/DL (ref 0.76–1.46)
TSH SERPL DL<=0.005 MIU/L-ACNC: 0.13 MU/L (ref 0.4–4)

## 2021-05-04 PROCEDURE — 99214 OFFICE O/P EST MOD 30 MIN: CPT | Performed by: INTERNAL MEDICINE

## 2021-05-04 PROCEDURE — 36415 COLL VENOUS BLD VENIPUNCTURE: CPT | Performed by: INTERNAL MEDICINE

## 2021-05-04 PROCEDURE — 84443 ASSAY THYROID STIM HORMONE: CPT | Performed by: INTERNAL MEDICINE

## 2021-05-04 PROCEDURE — 84439 ASSAY OF FREE THYROXINE: CPT | Performed by: INTERNAL MEDICINE

## 2021-05-04 PROCEDURE — 84432 ASSAY OF THYROGLOBULIN: CPT | Performed by: INTERNAL MEDICINE

## 2021-05-04 PROCEDURE — 86800 THYROGLOBULIN ANTIBODY: CPT | Performed by: INTERNAL MEDICINE

## 2021-05-04 NOTE — NURSING NOTE
Chief Complaint   Patient presents with     RECHECK     Thyroid Problem       Initial BP (!) 159/91 (BP Location: Right arm, Patient Position: Sitting, Cuff Size: Adult Regular)   Pulse 80   Resp 14   Wt 78.5 kg (173 lb)  There is no height or weight on file to calculate BMI.  BP completed using cuff size: regular  Medications and allergies reviewed.      Dorie JIMÉNEZ MA

## 2021-05-04 NOTE — LETTER
5/4/2021         RE: Waldemar Chua  [address] [address]        Dear Colleague,    Thank you for referring your patient, Waldemar Chua, to the [facility]. Please see a copy of my visit note below.    S: Patient presents for management of thyroid cancer.   His primary care noticed a lump on exam in 2010.     FNA 4/1/10:  DIAGNOSIS:  A. RIGHT NECK MASS, FINE NEEDLE ASPIRATION:       1) BLOOD AND MACROPHAGES, RARE DEGENERATED CELLS       2) NO TUMOR SEEN       3) PLEASE SEE COMMENT.    B. CYST FLUID FROM RIGHT NECK MASS, CELL BLOCK:       1) BLOOD AND MACROPHAGES       2) NO TUMOR SEEN       3) PLEASE SEE COMMENT.    COMMENT:  Evaluation of this sample is limited, since diagnostic cyst  lining cells are not identified. The macrophages identified are  compatible with benign cyst degeneration.  However, the  possibility of an underlying neoplastic process is not entirely  excluded. Recommend clinical correlation and close follow-up. If  clinically warranted, additional sampling (e.g., open biopsy) of  this lesion near the carotid may provide additional information.    Surgery 5/14/10:  MICRO/DIAGNOSIS:  RIGHT NECK MASS, EXCISIONS IN TWO PARTS       -    LYMPH NODE TISSUE, POSITIVE FOR PAPILLARY THYROID            CARCINOMA IN BOTH SPECIMENS (2/2).    Surgery 5/21/10:  MICRO/DIAGNOSIS:  A) THYROID GLAND, RIGHT LOBE, LOBECTOMY:     DOMINANT TUMOR:       - TUMOR LATERALITY: RIGHT       - TUMOR SIZE: 1.2 CM IN GREATEST DIMENSION       - HISTOLOGIC TYPE: PAPILLARY CARCINOMA, CLASSICAL TYPE       - HISTOLOGIC GRADE: GRADE 1, WELL DIFFERENTIATED       - MARGINS: NEGATIVE FOR TUMOR, 0.1 MM TO CLOSEST INKED         CAPSULAR SURFACE MARGIN       - TUMOR CAPSULE: PARTIALLY ENCAPSULATED       - TUMOR CAPSULE INVASION: NOT IDENTIFIED       - LYMPH-VASCULAR INVASION: NOT IDENTIFIED       - PERINEURAL INVASION: NOT IDENTIFIED       - EXTRATHYROIDAL EXTENSION: NOT IDENTIFIED        "SECOND TUMOR: NOT APPLICABLE       TUMOR FOCALITY: UNIFOCAL       RECEIVED: IN FORMALIN       SPECIMEN INTEGRITY: RECEIVED INTACT       SPECIMEN SIZE: SEE GROSS DESCRIPTION BELOW       SPECIMEN WEIGHT: NOT OBTAINED BY PROSECTOR       ADDITIONAL PATHOLOGIC FINDINGS: NONE       ANCILLARY STUDIES: NONE       CLINICAL HISTORY: AS PROVIDED BELOW       PATHOLOGIC STAGING (pTNM):       - TNM DESCRIPTORS: NOT APPLICABLE       - PRIMARY TUMOR: PT1b       - REGIONAL LYMPH NODES: TWO POSITIVE LYMPH NODES FROM PRIOR         SPECIMEN (010-1058) (2/2) AND FOUR POSITIVE LYMPH NODES OF         36 LYMPH NODES IN CURRENT TISSUES, FOUR LYMPH NODES         INVOLVED, 36 EXAMINED, PLUS 2/2 FROM ([id number]): THUS         6 POSITIVE NODES AND 38 EXAMINED WHEN INCLUDING ([id number])         pN1b       - LYMPH NODE EXTRANODAL EXTENSION: NOT IDENTIFIED       - DISTANT METASTASIS: NOT APPLICABLE.    B) THYROID GLAND, LEFT LOBE, LOBECTOMY       - BENIGN THYROID GLAND, NEGATIVE FOR TUMOR.    C) CENTRAL COMPARTMENT LYMPH NODES, EXCISION       - TWO OF THREE LYMPH NODES POSITIVE FOR THYROID CARCINOMA         (2/3)       - ASSOCIATED BENIGN THYROID TISSUE AS WELL IS HISTOLOGICALLY         IDENTIFIED.    D) MIDCERVICAL \"NODE,\" BIOPSY       - MATURE FIBROFATTY TISSUE, NEGATIVE FOR TUMOR AND NEGATIVE         FOR LYMPH NODE.    E) CERVICAL NODE, REGIONAL DISSECTION       - ONE OF NINE LYMPH NODES POSITIVE FOR PAPILLARY THYROID         CARCINOMA (1/9)       - ASSOCIATED BENIGN THYMIC TISSUE WITH FOCAL SMALL CYST.    F) RIGHT MODIFIED NECK RESECTION       - 24 LYMPH NODES IDENTIFIED, ONE POSITIVE FOR PAPILLARY         CARCINOMA (1/24).    He did receive I 131 on 7/22/2010 but the dose is not listed.     FNA left LN from 5/15/12:  LEFT NECK LYMPH NODE, ULTRASOUND-GUIDED FINE NEEDLE ASPIRATION       -    MIXED LYMPHOID INFILTRATE.       -    NO EVIDENCE OF MALIGNANCY.    He is currently taking 137 mcg of levothyroxine daily.   No palpitations, sleep " disturbance, tremors.     He has lost 10 pounds in the last year. More active with building deck at his cabin and has been able to keep it off.     He has been vaccinated for COVID. Completed series in early 4/2021.     ROS: 10 point ROS neg other than the symptoms noted above in the HPI.    Exam:   Vital signs:      BP: (!) 159/91 Pulse: 80   Resp: 14         Weight: 78.5 kg (173 lb)  There is no height or weight on file to calculate BMI.   Vital signs:      BP: (!) 126/91 Pulse: 77   Resp: 14         Weight: 78.5 kg (173 lb)  There is no height or weight on file to calculate BMI.  Gen: In NAD.   HEENT: no proptosis or lid lag, EOMI, no palpable thyroid tissue. No LAD.   Card: S1 S2 RRR no m/r/g. no LE edema.   Pulm: CTA b/l.   MSK: no gross deformities.   Derm: no rashes or lesions.   Neuro: no tremor, +2 DTR's.     A/P:   Papillary thyroid cancer - pT1b pN1b. Surgery on 5/14/10 and 5/21/10. I 131 in 7/2010. Discussed natural history and management of thyroid cancer.   TSH - 0.19 in 3/2019. No symptoms of hyperthyroidism.   -Repeat today. No change to levothyroxine yet.   Tg - Negative with negative antibodies since 2010.  Negative with negative Ab in 2/2020.   Repeat now.   US -normal in 6/2017.   2/4/2020: The patient is status post thyroidectomy (4/1/2010) for  right sided papillary thyroid carcinoma, reportedly with lymph node  metastases.  No suspicious nodules in the thyroidectomy bed.   There are 2 small lymph nodes in the right neck. At level IV, there is  a 0.9 x 0.4 x 0.8 cm lymph node. At level V, there is a 0.9 x 0.4 x  0.7 cm lymph node. No specific worrisome imaging features. No  left-sided lymph nodes.    Recommended US in 3 months but delayed 2/2 COVID.     -Schedule ultrasound.     Tonny Basurto MD on 5/4/2021 at 1:33 PM          Again, thank you for allowing me to participate in the care of your patient.        Sincerely,        Tonny Basurto MD

## 2021-05-04 NOTE — PROGRESS NOTES
Problem: Falls - Risk of:  Goal: Will remain free from falls  Description  Will remain free from falls  9/15/2019 2253 by Chet Rogers RN  Outcome: Ongoing  Note:   Pt fall risk, fall band present, falling star, safety alarm activated and in use as needed. Hourly rounding performed. Pt encouraged to use call light. See Lissa Faustin fall risk assessment. 9/15/2019 1154 by Yasmine Coronado RN  Note:   Pt will call out with needs    Goal: Absence of physical injury  Description  Absence of physical injury  9/15/2019 2253 by Chet Rogers RN  Outcome: Ongoing  Note:   Non-skid socks in place, up with assistance, bed in lowest position, bed exit & alarm as needed, provide toileting every 2 hours an d as needed. 9/15/2019 1154 by Yasmine Coronado RN  Outcome: Met This Shift     Problem: Risk for Impaired Skin Integrity  Goal: Tissue integrity - skin and mucous membranes  Description  Structural intactness and normal physiological function of skin and  mucous membranes. Outcome: Ongoing  Note:   Continuing to monitor for skin integrity risks. Patient independent with turning/repositioning. Turning/repositioning encouraged at least once every 2 hrs, and prn basis. Hygiene care being completed independently per patient; assistance provided when deemed necessary. Problem: Fluid Volume - Deficit  Goal: Absence of fluid volume deficit signs and symptoms  Description  Absence of fluid volume deficit signs and symptoms     Outcome: Ongoing  Note:   Assessed for signs & symptoms of fluid imbalance and/or fluid loss. Assessed for signs of dehydration. Promoted fluid intake per protocol. IVF administered as ordered. S: Patient presents for management of thyroid cancer.   His primary care noticed a lump on exam in 2010.     FNA 4/1/10:  DIAGNOSIS:  A. RIGHT NECK MASS, FINE NEEDLE ASPIRATION:       1) BLOOD AND MACROPHAGES, RARE DEGENERATED CELLS       2) NO TUMOR SEEN       3) PLEASE SEE COMMENT.    B. CYST FLUID FROM RIGHT NECK MASS, CELL BLOCK:       1) BLOOD AND MACROPHAGES       2) NO TUMOR SEEN       3) PLEASE SEE COMMENT.    COMMENT:  Evaluation of this sample is limited, since diagnostic cyst  lining cells are not identified. The macrophages identified are  compatible with benign cyst degeneration.  However, the  possibility of an underlying neoplastic process is not entirely  excluded. Recommend clinical correlation and close follow-up. If  clinically warranted, additional sampling (e.g., open biopsy) of  this lesion near the carotid may provide additional information.    Surgery 5/14/10:  MICRO/DIAGNOSIS:  RIGHT NECK MASS, EXCISIONS IN TWO PARTS       -    LYMPH NODE TISSUE, POSITIVE FOR PAPILLARY THYROID            CARCINOMA IN BOTH SPECIMENS (2/2).    Surgery 5/21/10:  MICRO/DIAGNOSIS:  A) THYROID GLAND, RIGHT LOBE, LOBECTOMY:     DOMINANT TUMOR:       - TUMOR LATERALITY: RIGHT       - TUMOR SIZE: 1.2 CM IN GREATEST DIMENSION       - HISTOLOGIC TYPE: PAPILLARY CARCINOMA, CLASSICAL TYPE       - HISTOLOGIC GRADE: GRADE 1, WELL DIFFERENTIATED       - MARGINS: NEGATIVE FOR TUMOR, 0.1 MM TO CLOSEST INKED         CAPSULAR SURFACE MARGIN       - TUMOR CAPSULE: PARTIALLY ENCAPSULATED       - TUMOR CAPSULE INVASION: NOT IDENTIFIED       - LYMPH-VASCULAR INVASION: NOT IDENTIFIED       - PERINEURAL INVASION: NOT IDENTIFIED       - EXTRATHYROIDAL EXTENSION: NOT IDENTIFIED       SECOND TUMOR: NOT APPLICABLE       TUMOR FOCALITY: UNIFOCAL       RECEIVED: IN FORMALIN       SPECIMEN INTEGRITY: RECEIVED INTACT       SPECIMEN SIZE: SEE GROSS DESCRIPTION BELOW       SPECIMEN WEIGHT: NOT OBTAINED BY PROSECTOR       ADDITIONAL PATHOLOGIC  "FINDINGS: NONE       ANCILLARY STUDIES: NONE       CLINICAL HISTORY: AS PROVIDED BELOW       PATHOLOGIC STAGING (pTNM):       - TNM DESCRIPTORS: NOT APPLICABLE       - PRIMARY TUMOR: PT1b       - REGIONAL LYMPH NODES: TWO POSITIVE LYMPH NODES FROM PRIOR         SPECIMEN ([id number]) (2/2) AND FOUR POSITIVE LYMPH NODES OF         36 LYMPH NODES IN CURRENT TISSUES, FOUR LYMPH NODES         INVOLVED, 36 EXAMINED, PLUS 2/2 FROM ([id number]): THUS         6 POSITIVE NODES AND 38 EXAMINED WHEN INCLUDING ([id number])         pN1b       - LYMPH NODE EXTRANODAL EXTENSION: NOT IDENTIFIED       - DISTANT METASTASIS: NOT APPLICABLE.    B) THYROID GLAND, LEFT LOBE, LOBECTOMY       - BENIGN THYROID GLAND, NEGATIVE FOR TUMOR.    C) CENTRAL COMPARTMENT LYMPH NODES, EXCISION       - TWO OF THREE LYMPH NODES POSITIVE FOR THYROID CARCINOMA         (2/3)       - ASSOCIATED BENIGN THYROID TISSUE AS WELL IS HISTOLOGICALLY         IDENTIFIED.    D) MIDCERVICAL \"NODE,\" BIOPSY       - MATURE FIBROFATTY TISSUE, NEGATIVE FOR TUMOR AND NEGATIVE         FOR LYMPH NODE.    E) CERVICAL NODE, REGIONAL DISSECTION       - ONE OF NINE LYMPH NODES POSITIVE FOR PAPILLARY THYROID         CARCINOMA (1/9)       - ASSOCIATED BENIGN THYMIC TISSUE WITH FOCAL SMALL CYST.    F) RIGHT MODIFIED NECK RESECTION       - 24 LYMPH NODES IDENTIFIED, ONE POSITIVE FOR PAPILLARY         CARCINOMA (1/24).    He did receive I 131 on 7/22/2010 but the dose is not listed.     FNA left LN from 5/15/12:  LEFT NECK LYMPH NODE, ULTRASOUND-GUIDED FINE NEEDLE ASPIRATION       -    MIXED LYMPHOID INFILTRATE.       -    NO EVIDENCE OF MALIGNANCY.    He is currently taking 137 mcg of levothyroxine daily.   No palpitations, sleep disturbance, tremors.     He has lost 10 pounds in the last year. More active with building deck at his cabin and has been able to keep it off.     He has been vaccinated for COVID. Completed series in early 4/2021.     ROS: 10 point ROS neg other than the symptoms " noted above in the HPI.    Exam:   Vital signs:      BP: (!) 159/91 Pulse: 80   Resp: 14         Weight: 78.5 kg (173 lb)  There is no height or weight on file to calculate BMI.   Vital signs:      BP: (!) 126/91 Pulse: 77   Resp: 14         Weight: 78.5 kg (173 lb)  There is no height or weight on file to calculate BMI.  Gen: In NAD.   HEENT: no proptosis or lid lag, EOMI, no palpable thyroid tissue. No LAD.   Card: S1 S2 RRR no m/r/g. no LE edema.   Pulm: CTA b/l.   MSK: no gross deformities.   Derm: no rashes or lesions.   Neuro: no tremor, +2 DTR's.     A/P:   Papillary thyroid cancer - pT1b pN1b. Surgery on 5/14/10 and 5/21/10. I 131 in 7/2010. Discussed natural history and management of thyroid cancer.   TSH - 0.19 in 3/2019. No symptoms of hyperthyroidism.   -Repeat today. No change to levothyroxine yet.   Tg - Negative with negative antibodies since 2010.  Negative with negative Ab in 2/2020.   Repeat now.   US -normal in 6/2017.   2/4/2020: The patient is status post thyroidectomy (4/1/2010) for  right sided papillary thyroid carcinoma, reportedly with lymph node  metastases.  No suspicious nodules in the thyroidectomy bed.   There are 2 small lymph nodes in the right neck. At level IV, there is  a 0.9 x 0.4 x 0.8 cm lymph node. At level V, there is a 0.9 x 0.4 x  0.7 cm lymph node. No specific worrisome imaging features. No  left-sided lymph nodes.    Recommended US in 3 months but delayed 2/2 COVID.     -Schedule ultrasound.     Tonny Basurto MD on 5/4/2021 at 1:33 PM

## 2021-05-04 NOTE — PATIENT INSTRUCTIONS
-Labs today.   -Schedule ultrasound.   Call [facility] scheduling for your procedure:  For scheduling in the [place] ([place], [facility], and [place]) call [phone] or [phone]      For scheduling at ealth ([facility], [facility], [place]), call [phone] or [phone]      For scheduling in the [place] ([facility]) call [phone]  or  [phone]      [facility]: [phone]

## 2021-05-05 LAB — THYROGLOB AB SERPL IA-ACNC: <20 IU/ML (ref 0–40)

## 2021-05-07 LAB — THYROGLOB SERPL-MCNC: <0.2 NG/ML

## 2021-05-24 ENCOUNTER — COMMUNICATION - HEALTHEAST (OUTPATIENT)
Dept: INTERNAL MEDICINE | Facility: CLINIC | Age: 61
End: 2021-05-24

## 2021-05-24 DIAGNOSIS — E78.5 HYPERLIPIDEMIA: ICD-10-CM

## 2021-05-31 VITALS — BODY MASS INDEX: 29.1 KG/M2 | HEIGHT: 68 IN | WEIGHT: 192 LBS

## 2021-05-31 VITALS — HEIGHT: 68 IN | BODY MASS INDEX: 28.49 KG/M2 | WEIGHT: 188 LBS

## 2021-05-31 NOTE — TELEPHONE ENCOUNTER
Refill Approved    Rx renewed per Medication Renewal Policy. Medication was last renewed on 8/22/2018 with 3 refills.  Last office visit: 3/29/2019 with PCP Dr CLEMENCIA Varela, [facility] Triage/Med Refill 8/24/2019     Requested Prescriptions   Pending Prescriptions Disp Refills     metFORMIN (GLUCOPHAGE-XR) 500 MG 24 hr tablet [Pharmacy Med Name: METFORMIN HCL  MG TABLET] 90 tablet 3     Sig: TAKE 1 TABLET BY MOUTH EVERY DAY       Metformin Refill Protocol Passed - 8/24/2019 12:20 AM        Passed - Blood pressure in last 12 months     BP Readings from Last 1 Encounters:   03/29/19 112/78             Passed - LFT or AST or ALT in last 12 months     Albumin   Date Value Ref Range Status   03/29/2019 4.5 3.5 - 5.0 g/dL Final     Bilirubin, Total   Date Value Ref Range Status   03/29/2019 0.6 0.0 - 1.0 mg/dL Final     Bilirubin, Direct   Date Value Ref Range Status   03/29/2019 0.2 <=0.5 mg/dL Final     Alkaline Phosphatase   Date Value Ref Range Status   03/29/2019 64 45 - 120 U/L Final     AST   Date Value Ref Range Status   03/29/2019 17 0 - 40 U/L Final     ALT   Date Value Ref Range Status   03/29/2019 17 0 - 45 U/L Final     Protein, Total   Date Value Ref Range Status   03/29/2019 7.0 6.0 - 8.0 g/dL Final                Passed - GFR or Serum Creatinine in last 6 months     GFR MDRD Non Af Amer   Date Value Ref Range Status   03/29/2019 >60 >60 mL/min/1.73m2 Final     GFR MDRD Af Amer   Date Value Ref Range Status   03/29/2019 >60 >60 mL/min/1.73m2 Final             Passed - Visit with PCP or prescribing provider visit in last 6 months or next 3 months     Last office visit with prescriber/PCP: Visit date not found OR same dept: Visit date not found OR same specialty: Visit date not found Last physical: 3/29/2019 Last MTM visit: Visit date not found         Next appt within 3 mo: Visit date not found  Next physical within 3 mo: Visit date not found  Prescriber OR PCP: Wiley SMITH  MD Tito  Last diagnosis associated with med order: There are no diagnoses linked to this encounter.   If protocol passes may refill for 12 months if within 3 months of last provider visit (or a total of 15 months).           Passed - A1C in last 6 months     Hemoglobin A1c   Date Value Ref Range Status   03/29/2019 6.5 (H) 3.5 - 6.0 % Final               Passed - Microalbumin in last year      Microalbumin, Random Urine   Date Value Ref Range Status   03/29/2019 0.84 0.00 - 1.99 mg/dL Final

## 2021-06-02 ENCOUNTER — RECORDS - HEALTHEAST (OUTPATIENT)
Dept: ADMINISTRATIVE | Facility: CLINIC | Age: 61
End: 2021-06-02

## 2021-06-02 VITALS — HEIGHT: 68 IN | BODY MASS INDEX: 27.58 KG/M2 | WEIGHT: 182 LBS

## 2021-06-03 ENCOUNTER — RECORDS - HEALTHEAST (OUTPATIENT)
Dept: ADMINISTRATIVE | Facility: CLINIC | Age: 61
End: 2021-06-03

## 2021-06-04 VITALS
OXYGEN SATURATION: 98 % | BODY MASS INDEX: 26.22 KG/M2 | WEIGHT: 173 LBS | DIASTOLIC BLOOD PRESSURE: 80 MMHG | SYSTOLIC BLOOD PRESSURE: 128 MMHG | HEIGHT: 68 IN | HEART RATE: 66 BPM

## 2021-06-06 NOTE — TELEPHONE ENCOUNTER
RN cannot approve Refill Request    RN can NOT refill this medication Protocol failed and NO refill given.     Marla Parisi, [facility] Triage/Med Refill 3/3/2020    Requested Prescriptions   Pending Prescriptions Disp Refills     metFORMIN (GLUCOPHAGE-XR) 500 MG 24 hr tablet [Pharmacy Med Name: METFORMIN HCL  MG TABLET] 90 tablet 3     Sig: TAKE 1 TABLET BY MOUTH EVERY DAY       Metformin Refill Protocol Failed - 3/3/2020  2:09 AM        Failed - Visit with PCP or prescribing provider visit in last 6 months or next 3 months     Last office visit with prescriber/PCP: Visit date not found OR same dept: Visit date not found OR same specialty: Visit date not found Last physical: Visit date not found Last MTM visit: Visit date not found         Next appt within 3 mo: Visit date not found  Next physical within 3 mo: Visit date not found  Prescriber OR PCP: Wiley Francis MD  Last diagnosis associated with med order: 1. Hyperlipidemia  - rosuvastatin (CRESTOR) 10 MG tablet; TAKE 1 TABLET BY MOUTH EVERY DAY  Dispense: 90 tablet; Refill: 0    2. Type 2 diabetes mellitus without complication, without long-term current use of insulin (H)  - metFORMIN (GLUCOPHAGE-XR) 500 MG 24 hr tablet [Pharmacy Med Name: METFORMIN HCL  MG TABLET]; TAKE 1 TABLET BY MOUTH EVERY DAY  Dispense: 90 tablet; Refill: 1     If protocol passes may refill for 12 months if within 3 months of last provider visit (or a total of 15 months).           Failed - A1C in last 6 months     Hemoglobin A1c   Date Value Ref Range Status   03/29/2019 6.5 (H) 3.5 - 6.0 % Final               Passed - Blood pressure in last 12 months     BP Readings from Last 1 Encounters:   03/29/19 112/78             Passed - LFT or AST or ALT in last 12 months     Albumin   Date Value Ref Range Status   03/29/2019 4.5 3.5 - 5.0 g/dL Final     Bilirubin, Total   Date Value Ref Range Status   03/29/2019 0.6 0.0 - 1.0 mg/dL Final     Bilirubin, Direct   Date  Value Ref Range Status   03/29/2019 0.2 <=0.5 mg/dL Final     Alkaline Phosphatase   Date Value Ref Range Status   03/29/2019 64 45 - 120 U/L Final     AST   Date Value Ref Range Status   03/29/2019 17 0 - 40 U/L Final     ALT   Date Value Ref Range Status   03/29/2019 17 0 - 45 U/L Final     Protein, Total   Date Value Ref Range Status   03/29/2019 7.0 6.0 - 8.0 g/dL Final                Passed - GFR or Serum Creatinine in last 6 months     GFR MDRD Non Af Amer   Date Value Ref Range Status   03/29/2019 >60 >60 mL/min/1.73m2 Final     GFR MDRD Af Amer   Date Value Ref Range Status   03/29/2019 >60 >60 mL/min/1.73m2 Final             Passed - Microalbumin in last year      Microalbumin, Random Urine   Date Value Ref Range Status   03/29/2019 0.84 0.00 - 1.99 mg/dL Final                Signed Prescriptions Disp Refills    rosuvastatin (CRESTOR) 10 MG tablet 90 tablet 0     Sig: TAKE 1 TABLET BY MOUTH EVERY DAY       Statins Refill Protocol (Hmg CoA Reductase Inhibitors) Passed - 3/3/2020  2:09 AM        Passed - PCP or prescribing provider visit in past 12 months      Last office visit with prescriber/PCP: Visit date not found OR same dept: Visit date not found OR same specialty: Visit date not found  Last physical: 3/29/2019 Last MTM visit: Visit date not found   Next visit within 3 mo: Visit date not found  Next physical within 3 mo: Visit date not found  Prescriber OR PCP: Wiley Francis MD  Last diagnosis associated with med order: 1. Hyperlipidemia  - rosuvastatin (CRESTOR) 10 MG tablet; TAKE 1 TABLET BY MOUTH EVERY DAY  Dispense: 90 tablet; Refill: 0    2. Type 2 diabetes mellitus without complication, without long-term current use of insulin (H)  - metFORMIN (GLUCOPHAGE-XR) 500 MG 24 hr tablet [Pharmacy Med Name: METFORMIN HCL  MG TABLET]; TAKE 1 TABLET BY MOUTH EVERY DAY  Dispense: 90 tablet; Refill: 1    If protocol passes may refill for 12 months if within 3 months of last provider visit (or a  total of 15 months).

## 2021-06-06 NOTE — TELEPHONE ENCOUNTER
Refill Approved    Rx renewed per Medication Renewal Policy. Medication was last renewed on 3/18/19.    Marla Parisi, [facility] Triage/Med Refill 3/3/2020     Requested Prescriptions   Pending Prescriptions Disp Refills     rosuvastatin (CRESTOR) 10 MG tablet [Pharmacy Med Name: ROSUVASTATIN CALCIUM 10 MG TAB] 90 tablet 3     Sig: TAKE 1 TABLET BY MOUTH EVERY DAY       Statins Refill Protocol (Hmg CoA Reductase Inhibitors) Passed - 3/3/2020  2:09 AM        Passed - PCP or prescribing provider visit in past 12 months      Last office visit with prescriber/PCP: Visit date not found OR same dept: Visit date not found OR same specialty: Visit date not found  Last physical: 3/29/2019 Last MTM visit: Visit date not found   Next visit within 3 mo: Visit date not found  Next physical within 3 mo: Visit date not found  Prescriber OR PCP: Wiley Francis MD  Last diagnosis associated with med order: 1. Hyperlipidemia  - rosuvastatin (CRESTOR) 10 MG tablet [Pharmacy Med Name: ROSUVASTATIN CALCIUM 10 MG TAB]; TAKE 1 TABLET BY MOUTH EVERY DAY  Dispense: 90 tablet; Refill: 3    2. Type 2 diabetes mellitus without complication, without long-term current use of insulin (H)  - metFORMIN (GLUCOPHAGE-XR) 500 MG 24 hr tablet [Pharmacy Med Name: METFORMIN HCL  MG TABLET]; TAKE 1 TABLET BY MOUTH EVERY DAY  Dispense: 90 tablet; Refill: 1    If protocol passes may refill for 12 months if within 3 months of last provider visit (or a total of 15 months).             metFORMIN (GLUCOPHAGE-XR) 500 MG 24 hr tablet [Pharmacy Med Name: METFORMIN HCL  MG TABLET] 90 tablet 1     Sig: TAKE 1 TABLET BY MOUTH EVERY DAY       Metformin Refill Protocol Failed - 3/3/2020  2:09 AM        Failed - Visit with PCP or prescribing provider visit in last 6 months or next 3 months     Last office visit with prescriber/PCP: Visit date not found OR same dept: Visit date not found OR same specialty: Visit date not found Last physical: Visit  date not found Last MTM visit: Visit date not found         Next appt within 3 mo: Visit date not found  Next physical within 3 mo: Visit date not found  Prescriber OR PCP: Wiley Francis MD  Last diagnosis associated with med order: 1. Hyperlipidemia  - rosuvastatin (CRESTOR) 10 MG tablet [Pharmacy Med Name: ROSUVASTATIN CALCIUM 10 MG TAB]; TAKE 1 TABLET BY MOUTH EVERY DAY  Dispense: 90 tablet; Refill: 3    2. Type 2 diabetes mellitus without complication, without long-term current use of insulin (H)  - metFORMIN (GLUCOPHAGE-XR) 500 MG 24 hr tablet [Pharmacy Med Name: METFORMIN HCL  MG TABLET]; TAKE 1 TABLET BY MOUTH EVERY DAY  Dispense: 90 tablet; Refill: 1     If protocol passes may refill for 12 months if within 3 months of last provider visit (or a total of 15 months).           Failed - A1C in last 6 months     Hemoglobin A1c   Date Value Ref Range Status   03/29/2019 6.5 (H) 3.5 - 6.0 % Final               Passed - Blood pressure in last 12 months     BP Readings from Last 1 Encounters:   03/29/19 112/78             Passed - LFT or AST or ALT in last 12 months     Albumin   Date Value Ref Range Status   03/29/2019 4.5 3.5 - 5.0 g/dL Final     Bilirubin, Total   Date Value Ref Range Status   03/29/2019 0.6 0.0 - 1.0 mg/dL Final     Bilirubin, Direct   Date Value Ref Range Status   03/29/2019 0.2 <=0.5 mg/dL Final     Alkaline Phosphatase   Date Value Ref Range Status   03/29/2019 64 45 - 120 U/L Final     AST   Date Value Ref Range Status   03/29/2019 17 0 - 40 U/L Final     ALT   Date Value Ref Range Status   03/29/2019 17 0 - 45 U/L Final     Protein, Total   Date Value Ref Range Status   03/29/2019 7.0 6.0 - 8.0 g/dL Final                Passed - GFR or Serum Creatinine in last 6 months     GFR MDRD Non Af Amer   Date Value Ref Range Status   03/29/2019 >60 >60 mL/min/1.73m2 Final     GFR MDRD Af Amer   Date Value Ref Range Status   03/29/2019 >60 >60 mL/min/1.73m2 Final             Passed -  Microalbumin in last year      Microalbumin, Random Urine   Date Value Ref Range Status   03/29/2019 0.84 0.00 - 1.99 mg/dL Final

## 2021-06-08 ENCOUNTER — RECORDS - HEALTHEAST (OUTPATIENT)
Dept: ADMINISTRATIVE | Facility: OTHER | Age: 61
End: 2021-06-08

## 2021-06-08 NOTE — TELEPHONE ENCOUNTER
RN cannot approve Refill Request    RN can NOT refill this medication Protocol failed and NO refill given.         Marla Parisi, [facility] Triage/Med Refill 6/1/2020    Requested Prescriptions   Pending Prescriptions Disp Refills     rosuvastatin (CRESTOR) 10 MG tablet [Pharmacy Med Name: ROSUVASTATIN CALCIUM 10 MG TAB] 90 tablet 3     Sig: TAKE 1 TABLET BY MOUTH EVERY DAY       Statins Refill Protocol (Hmg CoA Reductase Inhibitors) Failed - 5/29/2020 12:27 AM        Failed - PCP or prescribing provider visit in past 12 months      Last office visit with prescriber/PCP: Visit date not found OR same dept: Visit date not found OR same specialty: Visit date not found  Last physical: 3/29/2019 Last MTM visit: Visit date not found   Next visit within 3 mo: Visit date not found  Next physical within 3 mo: Visit date not found  Prescriber OR PCP: Wiley Francis MD  Last diagnosis associated with med order: 1. Hyperlipidemia  - rosuvastatin (CRESTOR) 10 MG tablet [Pharmacy Med Name: ROSUVASTATIN CALCIUM 10 MG TAB]; TAKE 1 TABLET BY MOUTH EVERY DAY  Dispense: 30 tablet; Refill: 2    If protocol passes may refill for 12 months if within 3 months of last provider visit (or a total of 15 months).

## 2021-06-09 NOTE — TELEPHONE ENCOUNTER
Future order for Shingrix placed.  He should return in 2 to 6 months   [Hypoglycemia] : hypoglycemic [FreeTextEntry1] : 70 y.o. female with h/o Type 1 1/2 DM diagnosed in 2009, osteoporosis, and hypothyroidism here for follow up visit. No acute events since last visit. On basal bolus regimen. Does get hypoglycemia but at various times. Taking Tresiba 22 units daily because when decreased Tresiba to 20 units developed fasting hyperglycemia. Takes Humalog 5 units before breakfast, 6 to 7 units before lunch and 6 to 8 units before dinner. Does have evening snack which includes 1/2 apple with no protein and typically does not cover with insulin. No complications. UTD with opthalmology and no retinopathy. Using Freestyle Leonora on and off because concerned about accuracy. Checks FS up to 6 times per day. No foot complaints. No proteinuria. UTD with cardiology and normal stress test. Reports continued weight loss. No abdominal pain and no change in bowels. \par \par In regards to osteoporosis,  DEXA scan in July 2017 left femoral neck -2.2, total hip -2.0 and 1/3 radius -3.6 and spine -1.3 is falsely elevated. DEXA scan performed in July 2018 shows spine -1.1 which is stable with arthritis, left femoral neck -2.1 which is stable and total hip -2.0 which is stable and 1/3 radius -3.6 which is stable. Taking Alendronate 70 mg weekly since July 2017. Working with dentist and had implants in October 2019. Takes calcium 500 mg daily and vitamin D 2,000 Iu daily. No falls or fractures. \par \par In regards to hypothyroidism, takes LT4 75 mcg daily. Feeling good. Reports weight loss despite good appetite.  [___] : [unfilled]

## 2021-06-10 ENCOUNTER — HOSPITAL ENCOUNTER (OUTPATIENT)
Dept: ULTRASOUND IMAGING | Facility: HOSPITAL | Age: 61
Discharge: HOME | End: 2021-06-10
Payer: COMMERCIAL

## 2021-06-10 DIAGNOSIS — C73 PAPILLARY THYROID CARCINOMA (H): ICD-10-CM

## 2021-06-11 NOTE — PROGRESS NOTES
Progress Note    Reason for Visit:  Chief Complaint     Thyroid Problem          Progress Note:    HPI:   This pleasant 56 year old male pt is here for F/u because of thyroid cancer. He is currently on synthroid 137 mcg, 6 days a week and on the 7th day he takes half a tablet only.     Component      Latest Ref Rng & Units 3/13/2017 6/6/2017   HDL Cholesterol      >=40 mg/dL     LDL Calculated      0 - 130 mg/dL     Thyroglobulin, Tumor Marker, S      ng/mL <0.1 <0.1   Thyroglobulin Antibody, S      <4.0 IU/mL <1.8 <1.8   Thyroglobulin Interpretation       SEE BELOW SEE BELOW   TSH      0.30 - 5.00 uIU/mL 0.93 0.25 (L)   Free T4      0.7 - 1.8 ng/dL 1.1 1.1   T3, Total      45 - 175 ng/dL 74 84         Review of Systems:    Nervous System: No headache, dizziness, fainting or memory loss. No tingling sensation of hand or feet.  Ears: No hearing loss or ringing in the ears  Eyes: No blurring of vision, redness, itching or dryness.  Nose: No nosebleed or loss of smell  Mouth: No mouth sores or loss of taste  Throat: No hoarseness or difficulty swallowing  Neck: No enlarged thyroid or lymph nodes.  Heart: No chest pain, palpitation or irregular heartbeat. No swelling of hands or feet  Lungs: No shortness of breath, cough, night sweats, wheezing or hemoptysis.  Gastrointestinal: No nausea or vomiting, constipation or diarrhea.  No acid reflux, abdominal pain or blood in stools.  Kidney/Bladdr: No polyuria, polydipsia, nocturia or hematuria.  Genital/Sexual: No loss of libido  Skin: No rash, hair loss or hirsutism.  No abnormal striae  Muscles/Joints/Bones: No morning stiffness, muscle aches and pain or loss of height.    Current Medications:  Current Outpatient Prescriptions   Medication Sig     aspirin 81 MG EC tablet Take 81 mg by mouth daily.     levothyroxine (SYNTHROID, LEVOTHROID) 137 MCG tablet Take 1 tablet (137 mcg total) by mouth daily.     rosuvastatin (CRESTOR) 10 MG tablet Take 1 tablet (10 mg total) by  mouth daily.     triamcinolone (KENALOG) 0.1 % cream Apply daily as needed       Patients Active Problems:  Patient Active Problem List   Diagnosis     Type 2 diabetes mellitus     Secondary hypothyroidism     Papillary thyroid carcinoma     Hyperlipidemia     Eczema       History:   reports that he has never smoked. He does not have any smokeless tobacco history on file. He reports that he drinks alcohol.   reports that he has never smoked. He does not have any smokeless tobacco history on file. He reports that he drinks alcohol. His drug history is not on file.  History   Smoking Status     Never Smoker   Smokeless Tobacco     Not on file      reports that he has never smoked. He does not have any smokeless tobacco history on file. He reports that he drinks alcohol. His drug history is not on file.  History   Sexual Activity     Sexual activity: Not on file     Past Medical History:   Diagnosis Date     Chronic hoarseness 2012    Atypical reflux suspected     Eczema 6/21/2016     Hyperlipidemia      Papillary thyroid carcinoma     PET scan normal 2011, followed by endocrinology, FNA 2012 of inflammatory lymph node     Secondary hypothyroidism     Thyroidectomy     Type 2 diabetes mellitus 2012     Family History   Problem Relation Age of Onset     Heart disease Father      MI in 60s     Osteoporosis Father      Skin cancer Sister      Past Medical History:   Diagnosis Date     Chronic hoarseness 2012    Atypical reflux suspected     Eczema 6/21/2016     Hyperlipidemia      Papillary thyroid carcinoma     PET scan normal 2011, followed by endocrinology, FNA 2012 of inflammatory lymph node     Secondary hypothyroidism     Thyroidectomy     Type 2 diabetes mellitus 2012     Past Surgical History:   Procedure Laterality Date     COLONOSCOPY      Normal September 2010 with family history colonic polyps, repeat 5 years     FOOT SURGERY       THYROIDECTOMY  05/2010    with neck dissection for papillary thyroid cancer  "      Vitals   height is 5' 8\" (1.727 m) and weight is 192 lb (87.1 kg). His blood pressure is 104/70.         Exam  General appearance: The patient looked well, not in acute distress.  Eyes: no evidence of thyroid eye disease.   Retinal exam: No evidence of diabetic retinopathy.  Mouth and Throat: Normal  Neck: No evidence of thyromegaly, enlarged lymph node or tenderness  Chest: Trachea is central. Chest is clear to auscultation and percussion. Breat sounds are normal.  Cardiovascular exam: JVP is not raised. Heart sounds are normal, no murmurs or rub  Peripheral pulses are palpable.   Abdomen: No masses or tenderness.    Back: No vertebral tenderness or kyphosis.  Extremities: No evidence of leg edema.   Skin: Normal to touch.  No abnormal striae  Neurologic exam:  Visual fields are intact by confrontation, grossly intact. No evidence of peripheral neuropathy.  Detailed foot exam normal.        Diagnosis:  No diagnosis found.    Orders:   No orders of the defined types were placed in this encounter.        Assessment and Plan:    Thyroid cancer  tsh dropped from 0.93 to 0.25. TG tumor marker is undetectable, which is good prognosis. We will continue on that same dose.     We will do thyroid ultrasound to make sure there is no evidence of tumor reccurance.     Patient is taking crystore 10 mg once a day. He does feel some fatigue and tiredness. I did advise him to take coq 10, 100 mg daily otc.     Thyroid exam is normal. He has a scar of right radical neck dissection. Patient takes baby aspirin 81 mg. I did advise to take vitamin D 2000 units daily otc.     Patient will return to clinic in 6 months. I did spent 40 minutes with the patient. More than 40% was spent on counseling and managing his care.   "

## 2021-06-15 NOTE — PROGRESS NOTES
Office Visit - Physical   Waldemar Chua   57 y.o.  male    Date of visit: 1/4/2018  Physician: Wiley Francis MD     Assessment and Plan   1. Routine general medical examination at a health care facility  Immunizations are reviewed and everything is up-to-date.  Living will has been discussed.  Non-smoker.  Uses alcohol in moderation.  Regular exercise discussed.  Last colonoscopy was 2010 and with family history of polyps, I would recommend repeating every 5 years and will schedule.  Prostate exam is normal and I will check a PSA for prostate cancer screening.  Recommending annual eye exam.  Skin exam performed and recommending regular use of sunblock.  Hepatitis C antibody for screening.      - Hemoglobin  - PSA, Annual Screen (Prostatic-Specific Antigen)  - Hepatitis C Antibody (Anti-HCV); Future  - Ambulatory referral for Colonoscopy    2. Papillary thyroid carcinoma  Followed by endocrinology and remains on Synthroid    3. Secondary hypothyroidism  Continues on Synthroid    4. Type 2 diabetes mellitus  Recheck hemoglobin A1c and other appropriate studies.  So far has been managed with diet and exercise.  Could increase his current exercise level.  Has made changes in diet.  Diabetic foot exam normal.  Recommending annual eye exam.  - Microalbumin, Random Urine  - Glycosylated Hemoglobin A1c  - Basic Metabolic Panel    5. Hyperlipidemia  Recheck lipid profile on Crestor and monitor LFTs.  Continue aspirin 81 mg daily.  - Lipid Cascade  - Hepatic Profile    6. Snoring with memory changes  Relatively new problem discussed at today's visit.  His wife also brought up the concern.  He has noticed himself being a bit more forgetful.  He does have significant amount of stress with his work and this could be playing a role.  However, we also discussed his sleeping habits.  He is a loud snore.  He awakens feeling well rested but by the end of the day, feels exhausted.  He can also easily nap on his day off.   There is strong family history for sleep apnea with both his brother and father with the diagnosis.  We discussed that this could be contributing to some of his cognition issues during the day.  I would recommend evaluation at sleep clinic.  We also discussed the role of weight loss in management of sleep apnea if he does not wish to wear CPAP.  - Ambulatory referral to Sleep Medicine  We will also check B12 level to make sure nothing else contributing to memory changes that he is seen    Over 15 minutes was spent addressing these  new medical problems beyond time spent performing usual annual physical with over 50% of the time spent counseling and coordination of care        Return in about 1 year (around 1/4/2019) for Annual physical.     Chief Complaint   Chief Complaint   Patient presents with     Annual Exam        Patient Profile   Social History     Social History Narrative        , 4 children        Past Medical History   Patient Active Problem List   Diagnosis     Type 2 diabetes mellitus     Secondary hypothyroidism     Papillary thyroid carcinoma     Hyperlipidemia     Snoring     Memory changes       Past Surgical History  He has a past surgical history that includes Thyroidectomy (05/2010); Foot surgery; and Colonoscopy.     History of Present Illness   This 57 y.o. old male here for annual physical, to follow-up chronic medical problems and to discuss some new concerns.  He has noticed himself being somewhat more forgetful.  Usually minor issues such as applying shaving cream and then brushing his teeth after forgetting to shave.  Nothing interfering with his work.  In fact, he deals with some high stress with his work and is questioning whether this could be affecting him dealing with other issues at home.  We also discussed his sleeping habits.  He is a snorer and his wife brings up this concern as well.  He feels rested in the morning but does feel exhausted by the end of the  day and can easily take a nap on one of his days off.  Strong family history of sleep apnea with both his brother and father with the diagnosis.    Review of Systems: A comprehensive review of systems was negative except as noted.  General: No chronic fatigue, unexpected weight loss or weight gain, fevers, chills, or night sweats  Eyes: No significant change in vision.  Seeing ophthalmologist regularly.  ENT: No ear or sinus infections.  No change in hearing.  No tinnitus.  Respiratory: No wheezing, dyspnea on exertion, or chronic cough  Cardiovascular: No chest pain, palpitations, dizziness, or syncope.  No peripheral edema.  GI: No abdominal pain, reflux symptoms, dysphagia, nausea, vomiting, constipation, or diarrhea  : No change in frequency or incontinence.  No hematuria.  Skin: No new rashes or lesions.  Neurologic: No headaches, seizures, dizziness, weakness, or numbness.  Musculoskeletal: No muscle or joint pain.  Lymphatic: No swollen lymph nodes  Psychiatric: No depression, anxiety, or sleep disorder.       Medications and Allergies   Current Outpatient Prescriptions   Medication Sig Dispense Refill     aspirin 81 MG EC tablet Take 81 mg by mouth daily.       cholecalciferol, vitamin D3, 1,000 unit tablet Take 1,000 Units by mouth daily.       levothyroxine (SYNTHROID, LEVOTHROID) 137 MCG tablet Take 1 tablet (137 mcg total) by mouth daily. 90 tablet 1     rosuvastatin (CRESTOR) 10 MG tablet TAKE 1 TABLET EVERY DAY 90 tablet 0     No current facility-administered medications for this visit.      Allergies   Allergen Reactions     Pravastatin      ineffective     Simvastatin      ineffective        Family and Social History   Family History   Problem Relation Age of Onset     Colon polyps Mother      Heart disease Father      MI in 60s     Osteoporosis Father      Melanoma Sister         Social History   Substance Use Topics     Smoking status: Never Smoker     Smokeless tobacco: Never Used     Alcohol  "use Yes      Comment: weekend        Physical Exam   General Appearance:   Well-appearing middle-aged male    /80 (Patient Site: Left Arm, Patient Position: Sitting, Cuff Size: Adult Large)  Pulse 67  Ht 5' 8\" (1.727 m)  Wt 188 lb (85.3 kg)  SpO2 98%  BMI 28.59 kg/m2    EYES: Eyelids, conjunctiva, and sclera were normal. Pupils were normal. Cornea, iris, and lens were normal bilaterally.  HEAD, EARS, NOSE, MOUTH, AND THROAT: Head and face were normal. Nose appearance was normal and there was no discharge. Oropharynx was normal.  NECK: Neck appearance was normal. There were no neck masses and the thyroid was not enlarged and no nodules are felt.  No lymphadenopathy.  RESPIRATORY: Breathing pattern was normal and the chest moved symmetrically.  Percussion/auscultatory percussion was normal.  Lung sounds were normal and there were no rales or wheezes.  CARDIOVASCULAR: Heart rate and rhythm were normal.  S1 and S2 were normal and there were no extra sounds or murmurs. Peripheral pulses in arms and legs were normal.  Jugular venous pressure was normal.  There was no peripheral edema.  No carotid bruits.  GASTROINTESTINAL: The abdomen was normal in contour.  Bowel sounds were present.  Percussion detected no organ enlargement or tenderness.  Palpation detected no tenderness, mass, or enlarged organs.   RECTAL/PROSTATE: No external lesions.  Sphincter tone normal.  No palpable rectal lesions.  Prostate normal size, smooth, nontender without palpable lesions.  MUSCULOSKELETAL: Skeletal configuration was normal and muscle mass was normal for age. Joint appearance was overall normal.  LYMPHATIC: There were no enlarged nodes.  SKIN/HAIR/NAILS: Skin color was normal.  There were no skin lesions.  Hair and nails were normal.  NEUROLOGIC: The patient was alert and oriented to person, place, time, and circumstance. Speech was normal. Cranial nerves were normal. Motor strength was normal for age. The patient was normally " coordinated.  Sensation intact.  Diabetic foot exam: Normal vibratory sensation, good pedal pulses and no other abnormality  PSYCHIATRIC:  Mood and affect were normal and the patient had normal recent and remote memory. The patient's judgment and insight were normal.       Additional Information        Wiley Francis MD  Internal Medicine  Contact me at [phone]

## 2021-06-16 PROBLEM — Z80.8 FAMILY HISTORY OF MELANOMA: Status: ACTIVE | Noted: 2020-07-24

## 2021-06-16 PROBLEM — R06.83 SNORING: Status: ACTIVE | Noted: 2018-01-04

## 2021-06-16 PROBLEM — E11.9 TYPE 2 DIABETES MELLITUS WITHOUT COMPLICATION, WITHOUT LONG-TERM CURRENT USE OF INSULIN (H): Status: ACTIVE | Noted: 2019-03-29

## 2021-06-16 PROBLEM — E55.9 VITAMIN D DEFICIENCY: Status: ACTIVE | Noted: 2019-03-29

## 2021-06-16 PROBLEM — M25.511 CHRONIC RIGHT SHOULDER PAIN: Status: ACTIVE | Noted: 2020-07-24

## 2021-06-16 PROBLEM — G89.29 CHRONIC RIGHT SHOULDER PAIN: Status: ACTIVE | Noted: 2020-07-24

## 2021-06-16 PROBLEM — Z86.0100 PERSONAL HISTORY OF COLONIC POLYPS: Status: ACTIVE | Noted: 2020-07-24

## 2021-06-17 NOTE — TELEPHONE ENCOUNTER
Refill Request  Medication name: rosuvastatin (CRESTOR) 10 MG tablet  Requested Prescriptions     Pending Prescriptions Disp Refills     rosuvastatin (CRESTOR) 10 MG tablet 90 tablet 3     Sig: Take 1 tablet (10 mg total) by mouth daily.     Who prescribed the medication: PCP  Last refill on medication: 6/1/2020  Requested Pharmacy: [facility]  Last appointment with PCP: 3/11/21  Next appointment: Not due    RT Hussain (R)

## 2021-06-17 NOTE — PATIENT INSTRUCTIONS - HE
Patient Instructions by Wiley Francis MD at 3/29/2019  8:40 AM     Author: Wiley Francis MD Service: -- Author Type: Physician    Filed: 3/29/2019  9:37 AM Encounter Date: 3/29/2019 Status: Addendum    : Wiley Francis MD (Physician)    Related Notes: Original Note by Wiley Francis MD (Physician) filed at 3/29/2019  9:36 AM           Firearm Safety  From 2005 to 2014, roughly 20,000 American minors were killed or seriously injured in accidental shootings; the majority of those killed in these tragic accidents were aged 12 or younger. As such the American Academy of Pediatrics, American Academy of Family Physicians, American College of Physicians and the National Rifle Association all recommend preventing unintentional use of firearms by securely storing firearms in locked safe or at minimum a trigger lock.  Patient Education   Understanding [employer] MyPlate  The USDA (US Department of Agriculture) has guidelines to help you make healthy food choices. These are called MyPlate. MyPlate shows the food groups that make up healthy meals using the image of a place setting. Before you eat, think about the healthiest choices for what to put onto your plate or into your cup or bowl. To learn more about building a healthy plate, visit www.choosemyplate.gov.       The Food Groups    Fruits: Any fruit or 100% fruit juice counts as part of the Fruit Group. Fruits may be fresh, canned, frozen, or dried, and may be whole, cut-up, or pureed. Make half your plate fruits and vegetables.    Vegetables: Any vegetable or 100% vegetable juice counts as a member of the Vegetable Group. Vegetables may be fresh, frozen, canned, or dried. They can be served raw or cooked and may be whole, cut-up, or mashed. Make half your plate fruits and vegetables.     Grains: All foods made from grains are part of the Grains Group. These include wheat, rice, oats, cornmeal, and barley such as bread, pasta, oatmeal, cereal,  tortillas, and grits. Grains should be no more than a quarter of your plate. At least half of your grains should be whole grains.    Protein: This group includes meat, poultry, seafood, beans and peas, eggs, processed soy products (like tofu), nuts (including nut butters), and seeds. Make protein choices no more than a quarter of your plate. Meat and poultry choices should be lean or low fat.    Dairy: All fluid milk products and foods made from milk that contain calcium, like yogurt and cheese are part of the Dairy Group. (Foods that have little calcium, such as cream, butter, and cream cheese, are not part of the group.) Most dairy choices should be low-fat or fat-free.    Oils: These are fats that are liquid at room temperature. They include canola, corn, olive, soybean, and sunflower oil. Foods that are mainly oil include mayonnaise, certain salad dressings, and soft margarines. You should have only 5 to 7 teaspoons of oils a day. You probably already get this much from the food you eat.  Use [employer] to Help Build Your Meals  The [employer] can help you plan and track your meals and activity. You can look up individual foods to see or compare their nutritional value. You can get guidelines for what and how much you should eat. You can compare your food choices. And you can assess personal physical activities and see ways you can improve. Go to www.[employer].gov/supertracker/.    1887-2211 The [employer]. [address] [place] [address], [place] [address]. All rights reserved. This information is not intended as a substitute for professional medical care. Always follow your healthcare professional's instructions.        Recommending new shingles vaccine, Shingrix.  Check with your insurance  Schedule colonoscopy for colon cancer screening  Continue annual flu shots  Schedule annual eye exam           Strong peripheral pulses

## 2021-06-18 NOTE — PATIENT INSTRUCTIONS - HE
Patient Instructions by Wiley Francis MD at 7/24/2020  2:20 PM     Author: Wiley Francis MD Service: -- Author Type: Physician    Filed: 7/24/2020  2:55 PM Encounter Date: 7/24/2020 Status: Addendum    : Wiley Francis MD (Physician)    Related Notes: Original Note by Wiley Francis MD (Physician) filed at 7/24/2020  2:54 PM         Patient Education   Understanding USDA MyPlate  The USDA (US Department of Agriculture) has guidelines to help you make healthy food choices. These are called MyPlate. MyPlate shows the food groups that make up healthy meals using the image of a place setting. Before you eat, think about the healthiest choices for what to put onto your plate or into your cup or bowl. To learn more about building a healthy plate, visit www.choosemyplate.gov.       The Food Groups    Fruits: Any fruit or 100% fruit juice counts as part of the Fruit Group. Fruits may be fresh, canned, frozen, or dried, and may be whole, cut-up, or pureed. Make half your plate fruits and vegetables.    Vegetables: Any vegetable or 100% vegetable juice counts as a member of the Vegetable Group. Vegetables may be fresh, frozen, canned, or dried. They can be served raw or cooked and may be whole, cut-up, or mashed. Make half your plate fruits and vegetables.     Grains: All foods made from grains are part of the Grains Group. These include wheat, rice, oats, cornmeal, and barley such as bread, pasta, oatmeal, cereal, tortillas, and grits. Grains should be no more than a quarter of your plate. At least half of your grains should be whole grains.    Protein: This group includes meat, poultry, seafood, beans and peas, eggs, processed soy products (like tofu), nuts (including nut butters), and seeds. Make protein choices no more than a quarter of your plate. Meat and poultry choices should be lean or low fat.    Dairy: All fluid milk products and foods made from milk that contain calcium, like  yogurt and cheese are part of the Dairy Group. (Foods that have little calcium, such as cream, butter, and cream cheese, are not part of the group.) Most dairy choices should be low-fat or fat-free.    Oils: These are fats that are liquid at room temperature. They include canola, corn, olive, soybean, and sunflower oil. Foods that are mainly oil include mayonnaise, certain salad dressings, and soft margarines. You should have only 5 to 7 teaspoons of oils a day. You probably already get this much from the food you eat.  Use [employer] to Help Build Your Meals  The [employer] can help you plan and track your meals and activity. You can look up individual foods to see or compare their nutritional value. You can get guidelines for what and how much you should eat. You can compare your food choices. And you can assess personal physical activities and see ways you can improve. Go to www.[employer].gov/[employer]cker/.    6758-2000 [employer]. [address] [place] [address], [place] [id number]. All rights reserved. This information is not intended as a substitute for professional medical care. Always follow your healthcare professional's instructions.        Healthcare maintenance    Return in 2 to 6 months to get your second Shingrix vaccine completed    Recommending annual flu shot every fall    Repeat colonoscopy in 2024    Continue to see your eye doctor every year    With family history of melanoma, I would recommend seeing a dermatologist every 1 to 2 years

## 2021-06-20 ENCOUNTER — HEALTH MAINTENANCE LETTER (OUTPATIENT)
Age: 61
End: 2021-06-20

## 2021-06-20 NOTE — PROGRESS NOTES
Progress Note    Reason for Visit:  Chief Complaint     Thyroid Problem          Progress Note:    HPI:     This patient is here for follow-up because of thyroid cancer.      Component      Latest Ref Rng & Units 1/4/2018 8/21/2018   Microalbumin, Random Urine      0.00 - 1.99 mg/dL <0.50    Creatinine, Urine      mg/dL 50.8    Microalbumin/Creatinine Ratio Random Urine      <=19.9 mg/g     Creatinine      0.70 - 1.30 mg/dL  1.02   GFR MDRD Af Amer      >60 mL/min/1.73m2  >60   GFR MDRD Non Af Amer      >60 mL/min/1.73m2  >60   Thyroglobulin Antibody      0.0 - 4.0 IU/mL  <0.9   Thyroglobulin, Serum or Plasma      1.3 - 31.8 ng/mL  <0.1 (L)   Thyroglobulin by LC-MS/MS, Serum/Plasma      1.3 - 31.8 ng/mL  Not Applicable   Free T4      0.7 - 1.8 ng/dL  1.3   TSH      0.30 - 5.00 uIU/mL  0.20 (L)   Hemoglobin A1c      3.5 - 6.0 % 7.6 (H) 7.3 (H)   T3, Total      45 - 175 ng/dL  60   Vitamin B-12      213 - 816 pg/mL 303    PSA      0.0 - 3.5 ng/mL 2.9    Potassium      3.5 - 5.0 mmol/L  4.4   Calcium      8.5 - 10.5 mg/dL  10.1   Vitamin D, Total (25-Hydroxy)      30.0 - 80.0 ng/mL  30.6     US THYROID  6/22/2017 11:44 AM     INDICATION: Papillary thyroid cancer, status post thyroidectomy and radioiodine ablation.  TECHNIQUE: Routine.  COMPARISON: 5/5/2012     FINDINGS: Thyroidectomy. No soft tissue nodules in the thyroidectomy bed. No cervical lymphadenopathy.       Review of Systems:    Nervous System: No headache, dizziness, fainting or memory loss. No tingling sensation of hand or feet.  Ears: No hearing loss or ringing in the ears  Eyes: No blurring of vision, redness, itching or dryness.  Nose: No nosebleed or loss of smell  Mouth: No mouth sores or loss of taste  Throat: No hoarseness or difficulty swallowing  Neck: No enlarged thyroid or lymph nodes.  Heart: No chest pain, palpitation or irregular heartbeat. No swelling of hands or feet  Lungs: No shortness of breath, cough, night sweats, wheezing or  hemoptysis.  Gastrointestinal: No nausea or vomiting, constipation or diarrhea.  No acid reflux, abdominal pain or blood in stools.  Kidney/Bladdr: No polyuria, polydipsia, nocturia or hematuria.  Genital/Sexual: No loss of libido  Skin: No rash, hair loss or hirsutism.  No abnormal striae  Muscles/Joints/Bones: No morning stiffness, muscle aches and pain or loss of height.    Current Medications:  Current Outpatient Prescriptions   Medication Sig     aspirin 81 MG EC tablet Take 81 mg by mouth daily.     cholecalciferol, vitamin D3, 1,000 unit tablet Take 1,000 Units by mouth daily.     levothyroxine (SYNTHROID, LEVOTHROID) 137 MCG tablet TAKE 1 TABLET BY MOUTH EVERY DAY     metFORMIN (GLUCOPHAGE XR) 500 MG 24 hr tablet Take 1 tablet (500 mg total) by mouth daily.     rosuvastatin (CRESTOR) 10 MG tablet TAKE 1 TABLET BY MOUTH EVERY DAY       Patients Active Problems:  Patient Active Problem List   Diagnosis     Type 2 diabetes mellitus (H)     Secondary hypothyroidism     Papillary thyroid carcinoma (H)     Hyperlipidemia     Snoring     Memory changes       History:   reports that he has never smoked. He has never used smokeless tobacco. He reports that he drinks alcohol.   reports that he has never smoked. He has never used smokeless tobacco. He reports that he drinks alcohol. His drug history is not on file.  History   Smoking Status     Never Smoker   Smokeless Tobacco     Never Used      reports that he has never smoked. He has never used smokeless tobacco. He reports that he drinks alcohol. His drug history is not on file.  History   Sexual Activity     Sexual activity: Not on file     Past Medical History:   Diagnosis Date     Chronic hoarseness 2012    Atypical reflux suspected     Eczema 6/21/2016     Hyperlipidemia      Memory changes 1/4/2018     Papillary thyroid carcinoma (H)     PET scan normal 2011, followed by endocrinology, FNA 2012 of inflammatory lymph node     Secondary hypothyroidism      "Thyroidectomy     Type 2 diabetes mellitus (H) 2012     Family History   Problem Relation Age of Onset     Colon polyps Mother      Heart disease Father      MI in 60s     Osteoporosis Father      Melanoma Sister      Past Medical History:   Diagnosis Date     Chronic hoarseness 2012    Atypical reflux suspected     Eczema 6/21/2016     Hyperlipidemia      Memory changes 1/4/2018     Papillary thyroid carcinoma (H)     PET scan normal 2011, followed by endocrinology, FNA 2012 of inflammatory lymph node     Secondary hypothyroidism     Thyroidectomy     Type 2 diabetes mellitus (H) 2012     Past Surgical History:   Procedure Laterality Date     COLONOSCOPY      Normal September 2010 with family history colonic polyps, repeat 5 years     FOOT SURGERY       THYROIDECTOMY  05/2010    with neck dissection for papillary thyroid cancer       Vitals   height is 5' 8\" (1.727 m) and weight is 182 lb (82.6 kg). His blood pressure is 136/84.         Exam  General appearance: The patient looked well, not in acute distress.  Eyes: no evidence of thyroid eye disease.   Retinal exam: No evidence of diabetic retinopathy.  Mouth and Throat: Normal  Neck: No evidence of thyromegaly, enlarged lymph node or tenderness  Chest: Trachea is central. Chest is clear to auscultation and percussion. Breat sounds are normal.  Cardiovascular exam: JVP is not raised. Heart sounds are normal, no murmurs or rub  Peripheral pulses are palpable.   Abdomen: No masses or tenderness.    Back: No vertebral tenderness or kyphosis.  Extremities: No evidence of leg edema.   Skin: Normal to touch.  No abnormal striae  Neurologic exam:  Visual fields are intact by confrontation, grossly intact. No evidence of peripheral neuropathy.  Detailed foot exam normal.        Diagnosis:  No diagnosis found.    Orders:   No orders of the defined types were placed in this encounter.        Assessment and Plan: Thyroid cancer.  Patient is currently on Synthroid 137 mcg " daily.  He has good energy level TSH 0.2 free T4 1.3.  He occasionally forgets his medicine once a week.    Thyroglobulin tumor marker is less than 0.1 thyroglobulin antibodies is not detectable.    I discussed with the patient is good prognosis.  We did thyroid ultrasound last year and it was fine will do another one 6 month.  Thyroid exam is normal.    He takes aspirin 81 mg vitamin D 1000 units a day his vitamin D is adequate at 30.6 calcium 10.1.    He is a newly diagnosed diabetes his A1c is 7.3.  He just he is about to start metformin 500 I did advise him to gradually increase the metformin until he is taken 4 a day.    His PSA has been going up 1.7-2.9.  I did advise him to follow-up with primary care physician regarding that.  Patient will return to clinic in 6 months    I have reviewed and ordered clinical lab test    I have reviewed and ordered radiology tests.    I have reviewed and ordered her medication as required.    I have reviewed her test results and advised with the performing physician.    I have reviewed the patient's old records.    I have reviewed and summarized the patient old records.    I did spend 40 minutes with the patient more than 50% was spent on counseling and managing her care.

## 2021-06-25 NOTE — TELEPHONE ENCOUNTER
Refill Approved    Rx renewed per Medication Renewal Policy. Medication was last renewed on 6/1/20.    David Jaramillo, [facility] Connection Triage/Med Refill 5/25/2021     Requested Prescriptions   Pending Prescriptions Disp Refills     rosuvastatin (CRESTOR) 10 MG tablet 90 tablet 3     Sig: Take 1 tablet (10 mg total) by mouth daily.       Statins Refill Protocol (Hmg CoA Reductase Inhibitors) Passed - 5/24/2021  5:41 PM        Passed - PCP or prescribing provider visit in past 12 months      Last office visit with prescriber/PCP: Visit date not found OR same dept: Visit date not found OR same specialty: Visit date not found  Last physical: 7/24/2020 Last MTM visit: Visit date not found   Next visit within 3 mo: Visit date not found  Next physical within 3 mo: Visit date not found  Prescriber OR PCP: Wiley Francis MD  Last diagnosis associated with med order: 1. Hyperlipidemia  - rosuvastatin (CRESTOR) 10 MG tablet; Take 1 tablet (10 mg total) by mouth daily.  Dispense: 90 tablet; Refill: 3    If protocol passes may refill for 12 months if within 3 months of last provider visit (or a total of 15 months).

## 2021-06-25 NOTE — TELEPHONE ENCOUNTER
RN cannot approve Refill Request    RN can NOT refill this medication PCP messaged that patient is overdue for Office Visit       Marla Parisi, [facility] Triage/Med Refill 3/18/2019    Requested Prescriptions   Pending Prescriptions Disp Refills     rosuvastatin (CRESTOR) 10 MG tablet [Pharmacy Med Name: ROSUVASTATIN CALCIUM 10 MG TAB] 90 tablet 3     Sig: TAKE 1 TABLET BY MOUTH EVERY DAY    Statins Refill Protocol (Hmg CoA Reductase Inhibitors) Failed - 3/15/2019  1:40 AM       Failed - PCP or prescribing provider visit in past 12 months     Last office visit with prescriber/PCP: Visit date not found OR same dept: Visit date not found OR same specialty: Visit date not found  Last physical: 1/4/2018 Last MTM visit: Visit date not found   Next visit within 3 mo: Visit date not found  Next physical within 3 mo: Visit date not found  Prescriber OR PCP: Wiley Francis MD  Last diagnosis associated with med order: 1. Hyperlipidemia  - rosuvastatin (CRESTOR) 10 MG tablet [Pharmacy Med Name: ROSUVASTATIN CALCIUM 10 MG TAB]; TAKE 1 TABLET BY MOUTH EVERY DAY  Dispense: 90 tablet; Refill: 0    If protocol passes may refill for 12 months if within 3 months of last provider visit (or a total of 15 months).

## 2021-06-27 NOTE — PROGRESS NOTES
Progress Notes by Wiley Francis MD at 3/29/2019  8:40 AM     Author: Wiley Francis MD Service: -- Author Type: Physician    Filed: 3/29/2019  4:48 PM Encounter Date: 3/29/2019 Status: Signed    : Wiley Francis MD (Physician)       MALE PREVENTATIVE EXAM    Assessment and Plan:       1. Routine general medical examination at a health care facility  Immunizations are reviewed and recommending Shingrix along with annual flu shot.  Will update tetanus.  Living will has been discussed.  Non-smoker.  Uses alcohol in moderation.  Regular exercise discussed.  Up to date with colonoscopies and this should be repeated this year.  Prostate exam is normal and I will check a PSA for prostate cancer screening.    Recommending annual eye exam.  Skin exam performed and recommending regular use of sunblock.  Hepatitis C antibody for screening was normal.     - Hemoglobin    2. Vitamin D deficiency  Vitamin D level was borderline low last year.  Forgot to refill vitamin D and currently not taking.  - cholecalciferol, vitamin D3, 1,000 unit tablet; Take 1 tablet (1,000 Units total) by mouth daily.; Refill: 0    3. Type 2 diabetes mellitus without complication, without long-term current use of insulin (H)  Started Metformin  mg daily last year his A1c was persistently over 7%.  Currently not monitoring sugars at home.  Will recheck A1c.  If still not at goal, I would continue efforts at regular exercise and would increase dose to 1000 mg daily.  I will also get him a glucose monitor and appropriate supplies.  Recommending annual eye exam and we discussed the risk for retinopathy.  Diabetic foot exam completed at today's visit.  Obtain microalbumin.  - Basic Metabolic Panel  - Urinalysis  - Vitamin D, Total (25-Hydroxy)  - Glycosylated Hemoglobin A1c    4. Secondary hypothyroidism with history of papillary thyroid carcinoma and thyroidectomy  Remains on levothyroxine.  Followed by endocrinology  -  Thyroid Stimulating Hormone (TSH)  - T4, Total        6. Memory changes  Symptoms are not concerning for early dementia.  He will forget to do something or cannot remember why he entered a room to do something but eventually will recall.  I suspect stress in his life related to work is playing a role.  Less likely, could be related to undiagnosed sleep apnea which is discussed below.  We will continue to monitor    7. Hyperlipidemia, unspecified hyperlipidemia type  Remains on rosuvastatin.  Continue aspirin 81 mg daily  - Lipid Cascade    8. Snoring with daytime hypersomnolence.  He can easily fall asleep at the end of the day.  There is a strong family history for sleep apnea.  I suspect sleep apnea may be present.  We discussed the role of weight loss and avoiding sleeping on back.  However, I think he would benefit from evaluation at sleep clinic and he will consider.  He is reluctant to wear CPAP.        10. Family history of colonic polyps  He should have a colonoscopy every 5 years and will get this scheduled    11. Medication monitoring encounter  Monitor liver studies while on statin  - Hepatic Profile    12. Screening for prostate cancer    - PSA, Annual Screen (Prostatic-Specific Antigen)    13. Encounter for colorectal cancer screening    - Ambulatory referral for Colonoscopy        Next follow up:  Return in 1 year (on 3/29/2020) for Annual physical.    Immunization Review  Adult Imm Review: Updating and recommending Shingrix Td       I discussed the following with the patient:   Adult Healthy Living: Importance of regular exercise  Healthy nutrition     Advice to securely store firearms given in AVS.  The patient was counseled and encouraged to consider modifying their diet and eating habits. He was provided with information on recommended healthy diet options.        Subjective:   Chief Complaint: Waldemar Chua is an 58 y.o. male here for a preventative health visit.     HPI: We again discussed  "his concerns about snoring and feeling tired at the end of the day with the possibility of undiagnosed sleep apnea.  He is reluctant to wear CPAP.    He also brings up some concerns regarding his memory.  He will enter a room and forget why he is there.  He will be asked to do something and will forget.  Always eventually remembers what he forgot to do.  He is under quite a bit of stress related to his work.    Healthy Habits  Are you taking a daily aspirin? Yes  Do you typically exercising at least 40 min, 3-4 times per week?  NO  Do you usually eat at least 4 servings of fruit and vegetables a day, include whole grains and fiber and avoid regularly eating high fat foods? NO  Have you had an eye exam in the past two years? Yes  Do you see a dentist twice per year? NO  Do you have any concerns regarding sleep? YES    Safety Screen  If you own firearms, are they secured in a locked gun cabinet or with trigger locks? NO  Do you feel you are safe where you are living?: Yes (3/29/2019  8:20 AM)  Do you feel you are safe in your relationship(s)?: Yes (3/29/2019  8:20 AM)      Review of Systems:  Please see above.  The rest of the review of systems are negative for all systems.     Cancer Screening       Status Date      COLONOSCOPY Next Due 9/20/2020      Done 9/20/2010      Patient has more history with this topic...              History     Reviewed By Date/Time Sections Reviewed    Wiley Francis MD 3/29/2019  9:03 AM Medical, Surgical, Tobacco, Alcohol, Drug Use, Sexual Activity, Family, Social Documentation    [facility] 3/29/2019  8:20 AM Tobacco, Alcohol, Drug Use, Sexual Activity            Objective:   Vital Signs:   Visit Vitals  /78 (Patient Site: Left Arm, Patient Position: Sitting, Cuff Size: Adult Large)   Pulse 73   Ht 5' 8\" (1.727 m)   Wt 182 lb (82.6 kg)   SpO2 98%   BMI 27.67 kg/m           PHYSICAL EXAM  EYES: Eyelids, conjunctiva, and sclera were normal. Pupils were normal. " Cornea, iris, and lens were normal bilaterally.  HEAD, EARS, NOSE, MOUTH, AND THROAT: Head and face were normal. Nose appearance was normal and there was no discharge. Oropharynx was normal.  NECK: Neck appearance was normal. There were no neck masses and the thyroid was not enlarged and no nodules are felt.  No lymphadenopathy.  RESPIRATORY: Breathing pattern was normal and the chest moved symmetrically.  Percussion/auscultatory percussion was normal.  Lung sounds were normal and there were no rales or wheezes.  CARDIOVASCULAR: Heart rate and rhythm were normal.  S1 and S2 were normal and there were no extra sounds or murmurs. Peripheral pulses in arms and legs were normal.  Jugular venous pressure was normal.  There was no peripheral edema.  No carotid bruits.  GASTROINTESTINAL: The abdomen was normal in contour.  Bowel sounds were present.   Palpation detected no tenderness, mass, or enlarged organs.   RECTAL/PROSTATE: No external lesions.  Sphincter tone normal.  No palpable rectal lesions.  Prostate normal size, smooth, nontender without nodules  MUSCULOSKELETAL: Skeletal configuration was normal and muscle mass was normal for age. Joint appearance was overall normal.  LYMPHATIC: There were no enlarged nodes.  SKIN/HAIR/NAILS: Skin color was normal.  Hair and nails were normal.There were no skin lesions.  NEUROLOGIC: The patient was alert and oriented to person, place, time, and circumstance. Speech was normal. Cranial nerves were normal. Motor strength was normal for age. The patient was normally coordinated.  Sensation intact.  Diabetic foot exam: Normal vibratory and pinprick sensation.  Excellent pedal pulses and no other abnormalities  PSYCHIATRIC:  Mood and affect were normal and the patient had normal recent and remote memory. The patient's judgment and insight were normal.             Medication List           Accurate as of 3/29/19  4:48 PM. If you have any questions, ask your nurse or doctor.                CONTINUE taking these medications    aspirin 81 MG EC tablet  INSTRUCTIONS:  Take 81 mg by mouth daily.        cholecalciferol (vitamin D3) 1,000 unit tablet  INSTRUCTIONS:  Take 1 tablet (1,000 Units total) by mouth daily.        levothyroxine 137 MCG tablet  Also known as:  SYNTHROID, LEVOTHROID  INSTRUCTIONS:  TAKE 1 TABLET BY MOUTH EVERY DAY        metFORMIN 500 MG 24 hr tablet  Also known as:  GLUCOPHAGE XR  INSTRUCTIONS:  Take 1 tablet (500 mg total) by mouth daily.        rosuvastatin 10 MG tablet  Also known as:  CRESTOR  INSTRUCTIONS:  TAKE 1 TABLET BY MOUTH EVERY DAY              Where to Get Your Medications      You can get these medications from any pharmacy    You don't need a prescription for these medications    cholecalciferol (vitamin D3) 1,000 unit tablet         Additional Screenings Completed Today:

## 2021-06-29 NOTE — PROGRESS NOTES
Progress Notes by Wiley Francis MD at 7/24/2020  2:20 PM     Author: Wiley Francis MD Service: -- Author Type: Physician    Filed: 7/24/2020  3:04 PM Encounter Date: 7/24/2020 Status: Signed    : Wiley Francis MD (Physician)       MALE PREVENTATIVE EXAM    Assessment and Plan:       1. Routine general medical examination at a health care facility  Immunizations are reviewed and providing first Shingrix today.  Return in 2 to 6 months for second.  Recommending annual flu shot every fall.  Consider Pneumovax 23 next year.  Living will discussed.  Non-smoker.  Uses alcohol in moderation.  Regular exercise discussed.  Up to date with colonoscopies and this should be repeated in 2024.  Prostate exam is normal and I will check a PSA for prostate cancer screening.   He sees his ophthalmologist every year. Skin exam performed and recommending regular use of sunblock.  Recommending dermatology appointment every 1 to 2 years.  Hepatitis C antibody for screening was normal.     2. Type 2 diabetes mellitus without complication, without long-term current use of insulin (H)  Continues on metformin XR.  Last A1c was 6.5%.  He is lost weight since last year and may be able to discontinue the medication if his A1c is come under better control.  Continue annual diabetic eye exams.  Diabetic foot exam completed.  He is on a statin.  - Glycosylated Hemoglobin A1c  - Microalbumin, Random Urine    3. Papillary thyroid carcinoma (H)  History of thyroidectomy followed by endocrinology  - HM2(CBC w/o Differential)  - Comprehensive Metabolic Panel    4. Secondary hypothyroidism  TSH, T4 and thyroglobulin checked by endocrinology earlier this year.  Continues on thyroid replacement.  Needs to repeat thyroid ultrasound after several tiny lymph nodes identified in February 2020.  We will contact his endocrinology clinic to get this scheduled.    5. Chronic right shoulder pain  Suspect right rotator cuff injury.   Recommending that he see orthopedics and providing name of Dr. Xavi Baker    6. Hyperlipidemia, unspecified hyperlipidemia type  Recheck lipid profile on rosuvastatin.  Taking aspirin daily  - Lipid Graham FASTING    7. Personal history of colonic polyps  Colonoscopy in 2019 did show adenomatous polyps.  He will need a colonoscopy every 5 years.    8. Vitamin D deficiency  Vitamin D level was low last year and did take replacement for a short time but not currently.  Recheck level.  There is family history of osteoporosis with his father with this diagnosis.  - Vitamin D, Total (25-Hydroxy)    9. Screening for prostate cancer    - PSA, Annual Screen (Prostatic-Specific Antigen)    10. Family history of melanoma  Recommending that he see a dermatologist every 1 to 2 years    Over 25 minutes was spent addressing these chronic and new medical problems beyond time spent performing annual physical with over 50% of the time spent counseling and coordination of care    Next follow up:  Return in 1 year (on 7/24/2021) for Annual physical.    Immunization Review  Adult Imm Review: Providing Shingrix      I discussed the following with the patient:   Adult Healthy Living: Importance of regular exercise    I have had an Advance Directives discussion with the patient.    Subjective:   Chief Complaint: Waldemar Chua is an 60 y.o. male here for a preventative health visit.     HPI: In addition to annual physical, several chronic medical problems and new concerns discussed today    Type 2 diabetes with good control last year with A1c 6.5% taking metformin XR.  He has been able to lose 9 pounds over the last year.  He has been more physically active this summer but does have a tough time sticking with routine exercise.  He saw his eye doctor earlier this year and continues to do this annually.  No peripheral neuropathy symptoms.  He takes a statin daily.    Right shoulder pain over the last 6 months.  He cannot recall any  "specific injury.  There is pain with specific movement of the arm.  He is left-handed.    History of papillary thyroid carcinoma with thyroidectomy and secondary hypothyroidism followed by endocrinology.  Continues on thyroid replacement and had thyroid ultrasound earlier this year which I reviewed.  Tiny cervical lymph nodes identified and repeat ultrasound recommended which he still needs to schedule.    Sleep concerns less of an issue now that he is retired.  No longer having daytime hypersomnolence.  Did not bring up concerns about memory as he did last year.    Healthy Habits  Are you taking a daily aspirin? Yes  Do you typically exercising at least 40 min, 3-4 times per week?  NO  Do you usually eat at least 4 servings of fruit and vegetables a day, include whole grains and fiber and avoid regularly eating high fat foods? NO  Have you had an eye exam in the past two years? Yes  Do you see a dentist twice per year? NO  Do you have any concerns regarding sleep? No    Safety Screen    Do you feel you are safe where you are living?: Yes (7/24/2020  2:06 PM)  Do you feel you are safe in your relationship(s)?: Yes (7/24/2020  2:06 PM)      Review of Systems:  Please see above.  The rest of the review of systems are negative for all systems.       Cancer Screening     Patient has no health maintenance due at this time              History     Reviewed By Date/Time Sections Reviewed    Wiley Francis MD 7/24/2020  2:15 PM Surgical, Tobacco, Alcohol, Drug Use, Sexual Activity, Family, Social Documentation    Callaghan, Wiley L, MD 7/24/2020  2:14 PM Medical    Alberta Grider CMA 7/24/2020  2:06 PM Tobacco, Alcohol, Drug Use, Sexual Activity            Objective:   Vital Signs:   Visit Vitals  /80 (Patient Site: Left Arm, Patient Position: Sitting, Cuff Size: Adult Large)   Pulse 66   Ht 5' 8\" (1.727 m)   Wt 173 lb (78.5 kg)   SpO2 98%   BMI 26.30 kg/m           PHYSICAL EXAM  EYES: Eyelids, " conjunctiva, and sclera were normal. Pupils were normal. Cornea, iris, and lens were normal bilaterally.  HEAD, EARS, NOSE, MOUTH, AND THROAT: Head and face were normal.  TMs normal and normal external auditory canals.  NECK: Neck appearance was normal. There were no neck masses and the thyroid was not enlarged and no nodules are felt.  No lymphadenopathy.  RESPIRATORY: Breathing pattern was normal and the chest moved symmetrically.  Percussion/auscultatory percussion was normal.  Lung sounds were normal and there were no rales or wheezes.  CARDIOVASCULAR: Heart rate and rhythm were normal.  S1 and S2 were normal and there were no extra sounds or murmurs. Peripheral pulses in arms and legs were normal.  Jugular venous pressure was normal.  There was no peripheral edema.  No carotid bruits.  GASTROINTESTINAL: The abdomen was normal in contour.  Bowel sounds were present.   Palpation detected no tenderness, mass, or enlarged organs.   RECTAL/PROSTATE: No external lesions.  Sphincter tone normal.  No palpable rectal lesions.  Prostate normal size, smooth, nontender without nodules  MUSCULOSKELETAL: Skeletal configuration was normal and muscle mass was normal for age. Joint appearance was overall normal.  Pain with full abduction of right shoulder as well as rotation.  Normal empty can sign.  LYMPHATIC: There were no enlarged nodes.  SKIN/HAIR/NAILS: Skin color was normal.  Hair and nails were normal.There were no skin lesions.  NEUROLOGIC: The patient was alert and oriented to person, place, time, and circumstance. Speech was normal. Cranial nerves were normal. Motor strength was normal for age. The patient was normally coordinated.  Sensation intact.  Diabetic foot exam: Good pedal pulses.  Normal vibratory and pinprick sensation.  PSYCHIATRIC:  Mood and affect were normal and the patient had normal recent and remote memory. The patient's judgment and insight were normal.               Medication List           Accurate as of July 24, 2020  3:03 PM. If you have any questions, ask your nurse or doctor.            CONTINUE taking these medications    aspirin 81 MG EC tablet  INSTRUCTIONS:  Take 81 mg by mouth daily.        levothyroxine 137 MCG tablet  Also known as:  SYNTHROID, LEVOTHROID  INSTRUCTIONS:  TAKE 1 TABLET BY MOUTH EVERY DAY        metFORMIN 500 MG 24 hr tablet  Also known as:  GLUCOPHAGE-XR  INSTRUCTIONS:  TAKE 1 TABLET BY MOUTH EVERY DAY        rosuvastatin 10 MG tablet  Also known as:  CRESTOR  INSTRUCTIONS:  TAKE 1 TABLET BY MOUTH EVERY DAY           STOP taking these medications    cholecalciferol (vitamin D3) 1,000 unit (25 mcg) tablet  Stopped by:  Wiley Francis MD            Additional Screenings Completed Today:

## 2021-07-20 DIAGNOSIS — E03.8 OTHER SPECIFIED HYPOTHYROIDISM: ICD-10-CM

## 2021-07-24 NOTE — TELEPHONE ENCOUNTER
"Routing refill request to provider for review/approval because:  Labs out of range:  TSH    Last Written Prescription Date:  7/27/2020  Last Fill Quantity: 90,  # refills: 3   Last office visit provider:  7/24/2020 Dr. Francis    levothyroxine (SYNTHROID, LEVOTHROID) 137 MCG tablet 90 tablet 3 7/27/2020  No   Sig: TAKE 1 TABLET BY MOUTH EVERY DAY   Sent to pharmacy as: levothyroxine 137 mcg tablet (SYNTHROID, LEVOTHROID)   E-Prescribing Status: Receipt confirmed by pharmacy (7/27/2020 12:30 PM CDT         Requested Prescriptions   Pending Prescriptions Disp Refills     levothyroxine (SYNTHROID/LEVOTHROID) 137 MCG tablet [Pharmacy Med Name: LEVOTHYROXINE 137 MCG TABLET] 90 tablet 3     Sig: TAKE 1 TABLET BY MOUTH EVERY DAY       Thyroid Protocol Failed - 7/20/2021  8:38 AM        Failed - Normal TSH on file in past 12 months     Recent Labs   Lab Test 05/04/21  1336   TSH 0.13*              Passed - Patient is 12 years or older        Passed - Recent (12 mo) or future (30 days) visit within the authorizing provider's specialty     Patient has had an office visit with the authorizing provider or a provider within the authorizing providers department within the previous 12 mos or has a future within next 30 days. See \"Patient Info\" tab in inbasket, or \"Choose Columns\" in Meds & Orders section of the refill encounter.              Passed - Medication is active on med list             Haley Courtney RN 07/24/21 4:06 PM  "

## 2021-07-26 RX ORDER — LEVOTHYROXINE SODIUM 137 UG/1
TABLET ORAL
Qty: 90 TABLET | Refills: 3 | Status: SHIPPED | OUTPATIENT
Start: 2021-07-26 | End: 2022-10-04 | Stop reason: DRUGHIGH

## 2021-07-30 ENCOUNTER — OFFICE VISIT (OUTPATIENT)
Dept: INTERNAL MEDICINE | Facility: CLINIC | Age: 61
End: 2021-07-30
Payer: COMMERCIAL

## 2021-07-30 VITALS
SYSTOLIC BLOOD PRESSURE: 122 MMHG | BODY MASS INDEX: 26.37 KG/M2 | OXYGEN SATURATION: 99 % | HEART RATE: 72 BPM | HEIGHT: 68 IN | WEIGHT: 174 LBS | DIASTOLIC BLOOD PRESSURE: 80 MMHG

## 2021-07-30 DIAGNOSIS — R35.0 BENIGN PROSTATIC HYPERPLASIA WITH URINARY FREQUENCY: ICD-10-CM

## 2021-07-30 DIAGNOSIS — Z86.0100 PERSONAL HISTORY OF COLONIC POLYPS: ICD-10-CM

## 2021-07-30 DIAGNOSIS — C73 PAPILLARY THYROID CARCINOMA (H): ICD-10-CM

## 2021-07-30 DIAGNOSIS — E11.9 TYPE 2 DIABETES MELLITUS WITHOUT COMPLICATION, WITHOUT LONG-TERM CURRENT USE OF INSULIN (H): ICD-10-CM

## 2021-07-30 DIAGNOSIS — N40.1 BENIGN PROSTATIC HYPERPLASIA WITH URINARY FREQUENCY: ICD-10-CM

## 2021-07-30 DIAGNOSIS — R41.3 MEMORY CHANGES: ICD-10-CM

## 2021-07-30 DIAGNOSIS — Z80.8 FAMILY HISTORY OF MELANOMA: ICD-10-CM

## 2021-07-30 DIAGNOSIS — Z00.00 ROUTINE GENERAL MEDICAL EXAMINATION AT A HEALTH CARE FACILITY: Primary | ICD-10-CM

## 2021-07-30 DIAGNOSIS — Z12.5 SCREENING FOR PROSTATE CANCER: ICD-10-CM

## 2021-07-30 DIAGNOSIS — E78.5 HYPERLIPIDEMIA, UNSPECIFIED HYPERLIPIDEMIA TYPE: ICD-10-CM

## 2021-07-30 DIAGNOSIS — E03.8 SECONDARY HYPOTHYROIDISM: ICD-10-CM

## 2021-07-30 DIAGNOSIS — E55.9 VITAMIN D DEFICIENCY: ICD-10-CM

## 2021-07-30 PROBLEM — R42 DIZZINESS: Status: RESOLVED | Noted: 2021-01-23 | Resolved: 2021-07-30

## 2021-07-30 PROBLEM — G89.29 CHRONIC RIGHT SHOULDER PAIN: Status: RESOLVED | Noted: 2020-07-24 | Resolved: 2021-07-30

## 2021-07-30 PROBLEM — M25.511 CHRONIC RIGHT SHOULDER PAIN: Status: RESOLVED | Noted: 2020-07-24 | Resolved: 2021-07-30

## 2021-07-30 PROBLEM — R42 DIZZINESS: Status: ACTIVE | Noted: 2021-01-23

## 2021-07-30 LAB
ALBUMIN SERPL-MCNC: 4.9 G/DL (ref 3.5–5)
ALBUMIN UR-MCNC: NEGATIVE MG/DL
ALP SERPL-CCNC: 81 U/L (ref 45–120)
ALT SERPL W P-5'-P-CCNC: 16 U/L (ref 0–45)
ANION GAP SERPL CALCULATED.3IONS-SCNC: 13 MMOL/L (ref 5–18)
APPEARANCE UR: CLEAR
AST SERPL W P-5'-P-CCNC: 21 U/L (ref 0–40)
BACTERIA #/AREA URNS HPF: NORMAL /HPF
BILIRUB SERPL-MCNC: 0.8 MG/DL (ref 0–1)
BILIRUB UR QL STRIP: NEGATIVE
BUN SERPL-MCNC: 15 MG/DL (ref 8–22)
CALCIUM SERPL-MCNC: 10.2 MG/DL (ref 8.5–10.5)
CHLORIDE BLD-SCNC: 100 MMOL/L (ref 98–107)
CHOLEST SERPL-MCNC: 207 MG/DL
CO2 SERPL-SCNC: 26 MMOL/L (ref 22–31)
COLOR UR AUTO: YELLOW
CREAT SERPL-MCNC: 1.06 MG/DL (ref 0.7–1.3)
CREAT UR-MCNC: 38 MG/DL
ERYTHROCYTE [DISTWIDTH] IN BLOOD BY AUTOMATED COUNT: 11.9 % (ref 10–15)
FASTING STATUS PATIENT QL REPORTED: YES
GFR SERPL CREATININE-BSD FRML MDRD: 75 ML/MIN/1.73M2
GLUCOSE BLD-MCNC: 171 MG/DL (ref 70–125)
GLUCOSE UR STRIP-MCNC: NEGATIVE MG/DL
HBA1C MFR BLD: 7.5 % (ref 0–5.6)
HCT VFR BLD AUTO: 47.1 % (ref 40–53)
HDLC SERPL-MCNC: 53 MG/DL
HGB BLD-MCNC: 15.9 G/DL (ref 13.3–17.7)
HGB UR QL STRIP: NEGATIVE
KETONES UR STRIP-MCNC: NEGATIVE MG/DL
LDLC SERPL CALC-MCNC: 124 MG/DL
LEUKOCYTE ESTERASE UR QL STRIP: ABNORMAL
MCH RBC QN AUTO: 28.4 PG (ref 26.5–33)
MCHC RBC AUTO-ENTMCNC: 33.8 G/DL (ref 31.5–36.5)
MCV RBC AUTO: 84 FL (ref 78–100)
MICROALBUMIN UR-MCNC: <0.5 MG/DL (ref 0–1.99)
MICROALBUMIN/CREAT UR: NORMAL MG/G{CREAT}
NITRATE UR QL: NEGATIVE
PH UR STRIP: 6.5 [PH] (ref 5–8)
PLATELET # BLD AUTO: 204 10E3/UL (ref 150–450)
POTASSIUM BLD-SCNC: 4.5 MMOL/L (ref 3.5–5)
PROT SERPL-MCNC: 7.9 G/DL (ref 6–8)
PSA SERPL-MCNC: 2.03 UG/L (ref 0–4.5)
RBC # BLD AUTO: 5.6 10E6/UL (ref 4.4–5.9)
RBC #/AREA URNS AUTO: NORMAL /HPF
SODIUM SERPL-SCNC: 139 MMOL/L (ref 136–145)
SP GR UR STRIP: 1.01 (ref 1–1.03)
SQUAMOUS #/AREA URNS AUTO: NORMAL /LPF
TRIGL SERPL-MCNC: 150 MG/DL
UROBILINOGEN UR STRIP-ACNC: 0.2 E.U./DL
VIT B12 SERPL-MCNC: 436 PG/ML (ref 213–816)
WBC # BLD AUTO: 4.8 10E3/UL (ref 4–11)
WBC #/AREA URNS AUTO: NORMAL /HPF

## 2021-07-30 PROCEDURE — G0103 PSA SCREENING: HCPCS | Performed by: INTERNAL MEDICINE

## 2021-07-30 PROCEDURE — 82043 UR ALBUMIN QUANTITATIVE: CPT | Performed by: INTERNAL MEDICINE

## 2021-07-30 PROCEDURE — 99396 PREV VISIT EST AGE 40-64: CPT | Mod: 25 | Performed by: INTERNAL MEDICINE

## 2021-07-30 PROCEDURE — 83036 HEMOGLOBIN GLYCOSYLATED A1C: CPT | Performed by: INTERNAL MEDICINE

## 2021-07-30 PROCEDURE — 36415 COLL VENOUS BLD VENIPUNCTURE: CPT | Performed by: INTERNAL MEDICINE

## 2021-07-30 PROCEDURE — 90732 PPSV23 VACC 2 YRS+ SUBQ/IM: CPT | Performed by: INTERNAL MEDICINE

## 2021-07-30 PROCEDURE — 82306 VITAMIN D 25 HYDROXY: CPT | Performed by: INTERNAL MEDICINE

## 2021-07-30 PROCEDURE — 80061 LIPID PANEL: CPT | Performed by: INTERNAL MEDICINE

## 2021-07-30 PROCEDURE — 90471 IMMUNIZATION ADMIN: CPT | Performed by: INTERNAL MEDICINE

## 2021-07-30 PROCEDURE — 81001 URINALYSIS AUTO W/SCOPE: CPT | Performed by: INTERNAL MEDICINE

## 2021-07-30 PROCEDURE — 99207 PR FOOT EXAM NO CHARGE: CPT | Mod: 25 | Performed by: INTERNAL MEDICINE

## 2021-07-30 PROCEDURE — 82607 VITAMIN B-12: CPT | Performed by: INTERNAL MEDICINE

## 2021-07-30 PROCEDURE — 99214 OFFICE O/P EST MOD 30 MIN: CPT | Mod: 25 | Performed by: INTERNAL MEDICINE

## 2021-07-30 PROCEDURE — 80053 COMPREHEN METABOLIC PANEL: CPT | Performed by: INTERNAL MEDICINE

## 2021-07-30 PROCEDURE — 85027 COMPLETE CBC AUTOMATED: CPT | Performed by: INTERNAL MEDICINE

## 2021-07-30 ASSESSMENT — MIFFLIN-ST. JEOR: SCORE: 1563.76

## 2021-07-30 NOTE — PATIENT INSTRUCTIONS
Preventive Health Recommendations  Male Ages 50 - 64    Yearly exam:             See your health care provider every year in order to  o   Review health changes.   o   Discuss preventive care.    o   Review your medicines if your doctor has prescribed any.     Have a cholesterol test every 5 years, or more frequently if you are at risk for high cholesterol/heart disease.         Have a colonoscopy at age 50, and every 5 years.  Due again in 2024    Talk with your health care provider about whether or not a prostate cancer screening test (PSA) is right for you.    You should be tested each year for STDs (sexually transmitted diseases), if you re at risk.     Shots: Get a flu shot each year. Get a tetanus shot every 10 years.     Nutrition:    Eat at least 5 servings of fruits and vegetables daily.     Eat whole-grain bread, whole-wheat pasta and brown rice instead of white grains and rice.     Get adequate Calcium and Vitamin D.     Lifestyle    Exercise for at least 150 minutes a week (30 minutes a day, 5 days a week). This will help you control your weight and prevent disease.     Limit alcohol to one drink per day.     No smoking.     Wear sunscreen to prevent skin cancer.  See a dermatologist annually with your family history of melanoma.    See your dentist every six months for an exam and cleaning.     See your eye doctor every year.

## 2021-07-30 NOTE — PROGRESS NOTES
3  SUBJECTIVE:   CC: Waldemar Chua is an 61 year old male who presents for preventive health visit.     HPI, in addition to annual preventive visit, here to follow-up chronic medical problems including papillary thyroid cancer, type 2 diabetes, and hypercholesterolemia.  See assessment and plan for details.      Patient has been advised of split billing requirements and indicates understanding: Yes      Healthy Habits:     Getting at least 3 servings of Calcium per day:  NO    Bi-annual eye exam:  Yes    Dental care twice a year:  NO    Sleep apnea or symptoms of sleep apnea:  Excessive snoring    Diet:  Regular (no restrictions)    Frequency of exercise:  None    Taking medications regularly:  Yes    Barriers to taking medications:  None    Medication side effects:  None    PHQ-2 Total Score: 0    Additional concerns today:  Yes    Today's PHQ-2 Score:   PHQ-2 ( 1999 [employer]) 7/30/2021 7/29/2021   Q1: Little interest or pleasure in doing things 0 0   Q2: Feeling down, depressed or hopeless 0 0   PHQ-2 Score 0 0   Q1: Little interest or pleasure in doing things - Not at all   Q2: Feeling down, depressed or hopeless - Not at all   PHQ-2 Score - 0       Abuse: Current or Past(Physical, Sexual or Emotional)-      Do you feel safe in your environment? Yes        Social History     Tobacco Use     Smoking status: Never Smoker     Smokeless tobacco: Never Used   Substance Use Topics     Alcohol use: Yes     Alcohol/week: 4.0 standard drinks     Types: 4 Standard drinks or equivalent per week     If you drink alcohol do you typically have >3 drinks per day or >7 drinks per week?                       Last PSA:   Prostate Specific Antigen Screen   Date Value Ref Range Status   07/24/2020 1.8 0.00 - 4.50 ng/mL Final       Reviewed orders with patient. Reviewed health maintenance and updated orders accordingly - Yes  Lab work is in process    Reviewed and updated as needed this visit by clinical staff  Tobacco   "Allergies  Meds  Problems  Med Hx  Surg Hx  Fam Hx          Reviewed and updated as needed this visit by Provider  Tobacco   Meds  Problems  Med Hx  Surg Hx  Fam Hx         Past Medical History:   Diagnosis Date     Chronic hoarseness 2012    Atypical reflux suspected     Chronic right shoulder pain 7/24/2020    Suspect rotator cuff injury.     Daytime hypersomnolence 3/29/2019     Dizziness 1/23/2021     Eczema 6/21/2016     Family history of colonic polyps 3/29/2019     Family history of melanoma 7/24/2020     Hyperlipidemia      Memory changes 1/4/2018     Papillary thyroid carcinoma (H)     PET scan normal 2011, followed by endocrinology, FNA 2012 of inflammatory lymph node     Personal history of colonic polyps 7/24/2020    Colonoscopy May 2019 with removal of polyps.  Repeat in 5 years     Secondary hypothyroidism     Thyroidectomy     Thyroid disease      Type 2 diabetes mellitus without complication, without long-term current use of insulin (H) 3/29/2019     Vitamin D deficiency 3/29/2019      Past Surgical History:   Procedure Laterality Date     FOOT SURGERY       THYROIDECTOMY  05/01/2010    with neck dissection for papillary thyroid cancer       ROS:  12 point review of systems is negative other than what is discussed in assessment and plan    OBJECTIVE:   /80 (BP Location: Right arm, Patient Position: Sitting, Cuff Size: Adult Regular)   Pulse 72   Ht 1.727 m (5' 8\")   Wt 78.9 kg (174 lb)   SpO2 99%   BMI 26.46 kg/m    EXAM:  EYES: Eyelids, conjunctiva, and sclera were normal. Pupils were normal. Cornea, iris, and lens were normal bilaterally.  HEAD, EARS, NOSE, MOUTH, AND THROAT: Head and face were normal. TMs and external auditory canals are normal  NECK: Neck appearance was normal. There were no neck masses and the thyroid was not enlarged and no nodules are felt.  No lymphadenopathy.  RESPIRATORY: Breathing pattern was normal and the chest moved symmetrically.   Lung sounds " were normal and there were no rales or wheezes.  CARDIOVASCULAR: Heart rate and rhythm were normal.  S1 and S2 were normal and there were no extra sounds or murmurs. Peripheral pulses in arms and legs were normal.  Jugular venous pressure was normal.  There was no peripheral edema.  No carotid bruits.  GASTROINTESTINAL:  Bowel sounds were present.   Palpation detected no tenderness, mass, or enlarged organs.   RECTAL/PROSTATE: No external lesions.  Sphincter tone normal.  No palpable rectal lesions.  Prostate mildly enlarged but smooth nontender without nodules  MUSCULOSKELETAL: Skeletal configuration was normal and muscle mass was normal for age. Joint appearance was overall normal.  LYMPHATIC: There were no enlarged nodes.  SKIN/HAIR/NAILS: Skin color was normal.  Hair and nails were normal.There were no skin lesions.  NEUROLOGIC: The patient was alert and oriented to person, place, time, and circumstance. Speech was normal. Cranial nerves were normal. Motor strength was normal for age. The patient was normally coordinated.  Sensation intact.  Diabetic foot exam: Normal monofilament exam and normal vibratory sensation  PSYCHIATRIC:  Mood and affect were normal and the patient had normal recent and remote memory. The patient's judgment and insight were normal.        ASSESSMENT/PLAN:   1. Routine general medical examination at a health care facility  Immunizations are reviewed and recommending annual flu shot and will provide Pneumovax 23.  Everything else is up-to-date.  Living will has been discussed.  Non-smoker.  Uses alcohol in moderation.  Regularexercise discussed.  Up to date with colonoscopies and this should be repeated in 2024.  Prostate exam is normal and I will check a PSA for prostate cancer screening.   Recommending that he sees his ophthalmologist every year. Skin examperformed and recommending regular use of sunblock.  He should see a dermatologist every year with his family history of melanoma.   Hepatitis C antibody for screening was normal.  Will screen for diabetes with fasting glucose.      2. Papillary thyroid carcinoma (H)  History of thyroidectomy.  Followed by endocrinology.  Reviewed recent lab results and he continues on same dose of Synthroid 137 mcg daily.  Thyroid ultrasound was normal in May.  - CBC with platelets; Future  - Comprehensive metabolic panel (BMP + Alb, Alk Phos, ALT, AST, Total. Bili, TP); Future    3. Type 2 diabetes mellitus without complication, without long-term current use of insulin (H)  Managed with Metformin and diet.  Currently not checking sugars at home but A1c has been consistently under 7% and this will be rechecked today.  Diabetic foot exam completed.  He takes a statin daily along with aspirin.  Check microalbumin.  Recommending annual diabetic eye exam.  - Comprehensive metabolic panel (BMP + Alb, Alk Phos, ALT, AST, Total. Bili, TP); Future  - UA Macro with Reflex to Micro and Culture - lab collect; Future  - Hemoglobin A1c; Future  - Albumin Random Urine Quantitative with Creat Ratio; Future  - PPSV23, IM/SUBQ (2+ YRS) - [id number]    4. Secondary hypothyroidism  Continues on Synthroid replacement with TSH within therapeutic range taking 137 mcg daily    5. Memory changes  He again brings up some concerns about his memory.  No family history of dementia.  He will often go into a room and forget what brought him there but will eventually remember.  He will also forget things that his wife tells him but he is also questioning whether he is listening carefully.  He has complete awareness of these things happening and will fully recall things if he did forget.  I am not too concerned that this represents an early dementia process but certainly will need to monitor things.  We discussed that there really is no medication to prevent the development of dementia but he should continue with healthy lifestyle choices including using alcohol in moderation, not smoking and  "getting regular exercise.  As a precaution, I will recheck a vitamin B12 level.  - Vitamin B12; Future    6. Hyperlipidemia, unspecified hyperlipidemia type  We will recheck a lipid profile taking rosuvastatin  - Lipid Profile (Chol, Trig, HDL, LDL calc); Future    7. Vitamin D deficiency  Recheck vitamin D level on replacement.  There is family history of osteoporosis.  - 25 OH Vit D therapy monitoring; Future    8. Benign prostatic hyperplasia with urinary frequency  Some mild symptoms including urinary frequency mostly as he is drinking a fair amount of water throughout the day.  Slight slower stream but nothing too bothersome.  Exam is normal.    9. Personal history of colonic polyps  Colonoscopy completed in 2019 and history of polyps.  Repeat every 5 years    10. Family history of melanoma  I am recommending that he see a dermatologist annually with his family history of melanoma    11. Screening for prostate cancer    - PSA, screen; Future    Patient has been advised of split billing requirements and indicates understanding: Yes      Estimated body mass index is 26.46 kg/m  as calculated from the following:    Height as of this encounter: 1.727 m (5' 8\").    Weight as of this encounter: 78.9 kg (174 lb).        He reports that he has never smoked. He has never used smokeless tobacco.      Counseling Resources:  ATP IV Guidelines  Pooled Cohorts Equation Calculator  FRAX Risk Assessment  ICSI Preventive Guidelines  Dietary Guidelines for Americans, 2010  USDA's MyPlate  ASA Prophylaxis  Lung CA Screening    Wiley Francis MD  [facility]  "

## 2021-07-30 NOTE — PROGRESS NOTES
SUBJECTIVE:   CC: Waldemar Chua is an 61 year old male who presents for preventative health visit.     {Split Bill scripting  The purpose of this visit is to discuss your medical history and prevent health problems before you are sick. You may be responsible for a co-pay, coinsurance, or deductible if your visit today includes services such as checking on a sore throat, having an x-ray or lab test, or treating and evaluating a new or existing condition :893273}  Patient has been advised of split billing requirements and indicates understanding: {Yes and No:285842}  Healthy Habits:     Getting at least 3 servings of Calcium per day:  NO    Bi-annual eye exam:  Yes    Dental care twice a year:  NO    Sleep apnea or symptoms of sleep apnea:  Excessive snoring    Diet:  Regular (no restrictions)    Frequency of exercise:  None    Taking medications regularly:  Yes    Barriers to taking medications:  None    Medication side effects:  None    PHQ-2 Total Score: 0    Additional concerns today:  Yes    {Add if <65 person on Medicare  - Required Questions (Optional):660708}  {Outside tests to abstract? :617099}    {additional problems to add (Optional):464725}    Today's PHQ-2 Score:   PHQ-2 ( 1999 Pfizer) 7/30/2021   Q1: Little interest or pleasure in doing things 0   Q2: Feeling down, depressed or hopeless 0   PHQ-2 Score 0   Q1: Little interest or pleasure in doing things -   Q2: Feeling down, depressed or hopeless -   PHQ-2 Score -       Abuse: Current or Past(Physical, Sexual or Emotional)- { :763663}  Do you feel safe in your environment? Yes        Social History     Tobacco Use     Smoking status: Never Smoker     Smokeless tobacco: Never Used   Substance Use Topics     Alcohol use: Not on file     {Rooming Staff- Complete this question if Prescreen response is not shown below for today's visit. If you drink alcohol do you typically have >3 drinks per day or >7 drinks per week? (Optional):673634}    Alcohol  "Use 7/29/2021   Prescreen: >3 drinks/day or >7 drinks/week? No   {add AUDIT responses (Optional) (A score of 7 for adult men is an indication of hazardous drinking; a score of 8 or more is an indication of an alcohol use disorder.  A score of 7 or more for adult women is an indication of hazardous drinking or an alchohol use disorder):486874}    Last PSA:   Prostate Specific Antigen Screen   Date Value Ref Range Status   07/24/2020 1.8 0.00 - 4.50 ng/mL Final       Reviewed orders with patient. Reviewed health maintenance and updated orders accordingly - { :047095::\"Yes\"}  {Chronicprobdata (optional):364877}    Reviewed and updated as needed this visit by clinical staff  Tobacco  Allergies  Meds  Problems  Med Hx  Surg Hx  Fam Hx          Reviewed and updated as needed this visit by Provider  Tobacco    Problems  Med Hx  Surg Hx  Fam Hx         {HISTORY OPTIONS (Optional):988549}    Review of Systems  {MALE ROS (Optional):789322::\"CONSTITUTIONAL: NEGATIVE for fever, chills, change in weight\",\"INTEGUMENTARY/SKIN: NEGATIVE for worrisome rashes, moles or lesions\",\"EYES: NEGATIVE for vision changes or irritation\",\"ENT: NEGATIVE for ear, mouth and throat problems\",\"RESP: NEGATIVE for significant cough or SOB\",\"CV: NEGATIVE for chest pain, palpitations or peripheral edema\",\"GI: NEGATIVE for nausea, abdominal pain, heartburn, or change in bowel habits\",\" male: negative for dysuria, hematuria, decreased urinary stream, erectile dysfunction, urethral discharge\",\"MUSCULOSKELETAL: NEGATIVE for significant arthralgias or myalgia\",\"NEURO: NEGATIVE for weakness, dizziness or paresthesias\",\"PSYCHIATRIC: NEGATIVE for changes in mood or affect\"}    OBJECTIVE:   There were no vitals taken for this visit.    Physical Exam  {Exam Choices (Optional):409577}    {Diagnostic Test Results (Optional):962588::\"Diagnostic Test Results:\",\"Labs reviewed in Epic\"}    ASSESSMENT/PLAN:   {Diag Picklist:613925}    Patient has been " "advised of split billing requirements and indicates understanding: {YES / NO:580457::\"Yes\"}  COUNSELING:   {MALE COUNSELING MESSAGES:323954::\"Reviewed preventive health counseling, as reflected in patient instructions\"}    Estimated body mass index is 26.3 kg/m  as calculated from the following:    Height as of 7/24/20: 1.727 m (5' 8\").    Weight as of 5/4/21: 78.5 kg (173 lb).     {Weight Management Plan (ACO) Complete if BMI is abnormal-  Ages 18-64  BMI >24.9.  Age 65+ with BMI <23 or >30 (Optional):674664}    He reports that he has never smoked. He has never used smokeless tobacco.      Counseling Resources:  ATP IV Guidelines  Pooled Cohorts Equation Calculator  FRAX Risk Assessment  ICSI Preventive Guidelines  Dietary Guidelines for Americans, 2010  USDA's MyPlate  ASA Prophylaxis  Lung CA Screening    Wiley Francis MD  [facility]  "

## 2021-08-03 DIAGNOSIS — E11.9 TYPE 2 DIABETES MELLITUS WITHOUT COMPLICATION, WITHOUT LONG-TERM CURRENT USE OF INSULIN (H): Primary | ICD-10-CM

## 2021-08-03 LAB
DEPRECATED CALCIDIOL+CALCIFEROL SERPL-MC: <49 UG/L (ref 20–75)
VITAMIN D2 SERPL-MCNC: <5 UG/L
VITAMIN D3 SERPL-MCNC: 44 UG/L

## 2021-08-03 RX ORDER — METFORMIN HCL 500 MG
1000 TABLET, EXTENDED RELEASE 24 HR ORAL DAILY
Qty: 180 TABLET | Refills: 3 | Status: SHIPPED | OUTPATIENT
Start: 2021-08-03 | End: 2022-08-05

## 2021-08-14 DIAGNOSIS — E78.5 HYPERLIPIDEMIA, UNSPECIFIED: ICD-10-CM

## 2021-08-17 RX ORDER — ROSUVASTATIN CALCIUM 10 MG/1
TABLET, COATED ORAL
Qty: 90 TABLET | Refills: 3 | Status: SHIPPED | OUTPATIENT
Start: 2021-08-17 | End: 2022-08-05

## 2021-08-17 NOTE — TELEPHONE ENCOUNTER
" Disp Refills Start End [facility]   rosuvastatin (CRESTOR) 10 MG tablet 90 tablet 0 5/25/2021  No   Sig - Route: Take 1 tablet (10 mg total) by mouth daily. - Oral   Sent to pharmacy as: rosuvastatin 10 mg tablet (CRESTOR)   E-Prescribing Status: Receipt confirmed by pharmacy (5/25/2021  1:56 PM CDT)       Last Written Prescription Date:  5/25/21  Last Fill Quantity: 90,  # refills: 0   Last office visit provider:  7/30/21     Requested Prescriptions   Pending Prescriptions Disp Refills     rosuvastatin (CRESTOR) 10 MG tablet [Pharmacy Med Name: ROSUVASTATIN CALCIUM 10 MG TAB] 90 tablet      Sig: TAKE 1 TABLET BY MOUTH EVERY DAY       Statins Protocol Passed - 8/14/2021  8:59 AM        Passed - LDL on file in past 12 months     Recent Labs   Lab Test 07/30/21  1029                Passed - No abnormal creatine kinase in past 12 months     No lab results found.             Passed - Recent (12 mo) or future (30 days) visit within the authorizing provider's specialty     Patient has had an office visit with the authorizing provider or a provider within the authorizing providers department within the previous 12 mos or has a future within next 30 days. See \"Patient Info\" tab in inbasket, or \"Choose Columns\" in Meds & Orders section of the refill encounter.              Passed - Medication is active on med list        Passed - Patient is age 18 or older             David Jaramillo RN 08/17/21 11:55 AM  "

## 2022-03-27 ENCOUNTER — HEALTH MAINTENANCE LETTER (OUTPATIENT)
Age: 62
End: 2022-03-27

## 2022-08-05 ENCOUNTER — OFFICE VISIT (OUTPATIENT)
Dept: INTERNAL MEDICINE | Facility: CLINIC | Age: 62
End: 2022-08-05
Payer: COMMERCIAL

## 2022-08-05 VITALS
WEIGHT: 173 LBS | SYSTOLIC BLOOD PRESSURE: 130 MMHG | HEIGHT: 68 IN | BODY MASS INDEX: 26.22 KG/M2 | HEART RATE: 60 BPM | DIASTOLIC BLOOD PRESSURE: 82 MMHG | TEMPERATURE: 98 F

## 2022-08-05 DIAGNOSIS — E03.8 SECONDARY HYPOTHYROIDISM: ICD-10-CM

## 2022-08-05 DIAGNOSIS — R35.0 BENIGN PROSTATIC HYPERPLASIA WITH URINARY FREQUENCY: ICD-10-CM

## 2022-08-05 DIAGNOSIS — E11.9 TYPE 2 DIABETES MELLITUS WITHOUT COMPLICATION, WITHOUT LONG-TERM CURRENT USE OF INSULIN (H): ICD-10-CM

## 2022-08-05 DIAGNOSIS — Z80.8 FAMILY HISTORY OF MELANOMA: ICD-10-CM

## 2022-08-05 DIAGNOSIS — E78.5 HYPERLIPIDEMIA, UNSPECIFIED HYPERLIPIDEMIA TYPE: ICD-10-CM

## 2022-08-05 DIAGNOSIS — Z12.5 SCREENING FOR PROSTATE CANCER: ICD-10-CM

## 2022-08-05 DIAGNOSIS — Z00.00 ROUTINE GENERAL MEDICAL EXAMINATION AT A HEALTH CARE FACILITY: Primary | ICD-10-CM

## 2022-08-05 DIAGNOSIS — R41.3 MEMORY CHANGES: ICD-10-CM

## 2022-08-05 DIAGNOSIS — Z86.0100 PERSONAL HISTORY OF COLONIC POLYPS: ICD-10-CM

## 2022-08-05 DIAGNOSIS — N40.1 BENIGN PROSTATIC HYPERPLASIA WITH URINARY FREQUENCY: ICD-10-CM

## 2022-08-05 DIAGNOSIS — C73 PAPILLARY THYROID CARCINOMA (H): ICD-10-CM

## 2022-08-05 DIAGNOSIS — E55.9 VITAMIN D DEFICIENCY: ICD-10-CM

## 2022-08-05 LAB
ALBUMIN SERPL BCG-MCNC: 4.9 G/DL (ref 3.5–5.2)
ALBUMIN UR-MCNC: 30 MG/DL
ALP SERPL-CCNC: 71 U/L (ref 40–129)
ALT SERPL W P-5'-P-CCNC: 14 U/L (ref 10–50)
ANION GAP SERPL CALCULATED.3IONS-SCNC: 11 MMOL/L (ref 7–15)
APPEARANCE UR: CLEAR
AST SERPL W P-5'-P-CCNC: 19 U/L (ref 10–50)
BACTERIA #/AREA URNS HPF: ABNORMAL /HPF
BILIRUB SERPL-MCNC: 0.6 MG/DL
BILIRUB UR QL STRIP: NEGATIVE
BUN SERPL-MCNC: 22.4 MG/DL (ref 8–23)
CALCIUM SERPL-MCNC: 9.5 MG/DL (ref 8.8–10.2)
CHLORIDE SERPL-SCNC: 101 MMOL/L (ref 98–107)
CHOLEST SERPL-MCNC: 185 MG/DL
COLOR UR AUTO: YELLOW
CREAT SERPL-MCNC: 0.92 MG/DL (ref 0.67–1.17)
CREAT UR-MCNC: 122 MG/DL
DEPRECATED HCO3 PLAS-SCNC: 25 MMOL/L (ref 22–29)
ERYTHROCYTE [DISTWIDTH] IN BLOOD BY AUTOMATED COUNT: 11.9 % (ref 10–15)
GFR SERPL CREATININE-BSD FRML MDRD: >90 ML/MIN/1.73M2
GLUCOSE SERPL-MCNC: 146 MG/DL (ref 70–99)
GLUCOSE UR STRIP-MCNC: NEGATIVE MG/DL
HBA1C MFR BLD: 7.3 % (ref 0–5.6)
HCT VFR BLD AUTO: 43.8 % (ref 40–53)
HDLC SERPL-MCNC: 49 MG/DL
HGB BLD-MCNC: 14.8 G/DL (ref 13.3–17.7)
HGB UR QL STRIP: NEGATIVE
KETONES UR STRIP-MCNC: ABNORMAL MG/DL
LDLC SERPL CALC-MCNC: 108 MG/DL
LEUKOCYTE ESTERASE UR QL STRIP: NEGATIVE
MCH RBC QN AUTO: 28.5 PG (ref 26.5–33)
MCHC RBC AUTO-ENTMCNC: 33.8 G/DL (ref 31.5–36.5)
MCV RBC AUTO: 84 FL (ref 78–100)
MICROALBUMIN UR-MCNC: 21.9 MG/L
MICROALBUMIN/CREAT UR: 17.95 MG/G CR (ref 0–17)
NITRATE UR QL: NEGATIVE
NONHDLC SERPL-MCNC: 136 MG/DL
PH UR STRIP: 5.5 [PH] (ref 5–8)
PLATELET # BLD AUTO: 189 10E3/UL (ref 150–450)
POTASSIUM SERPL-SCNC: 4.2 MMOL/L (ref 3.4–5.3)
PROT SERPL-MCNC: 7.1 G/DL (ref 6.4–8.3)
PSA SERPL-MCNC: 2.02 NG/ML (ref 0–4.5)
RBC # BLD AUTO: 5.2 10E6/UL (ref 4.4–5.9)
RBC #/AREA URNS AUTO: ABNORMAL /HPF
SODIUM SERPL-SCNC: 137 MMOL/L (ref 136–145)
SP GR UR STRIP: >=1.03 (ref 1–1.03)
SPERM #/AREA URNS HPF: PRESENT /HPF
SQUAMOUS #/AREA URNS AUTO: ABNORMAL /LPF
TRIGL SERPL-MCNC: 140 MG/DL
UROBILINOGEN UR STRIP-ACNC: 0.2 E.U./DL
WBC # BLD AUTO: 4.7 10E3/UL (ref 4–11)
WBC #/AREA URNS AUTO: ABNORMAL /HPF

## 2022-08-05 PROCEDURE — 99214 OFFICE O/P EST MOD 30 MIN: CPT | Mod: 25 | Performed by: INTERNAL MEDICINE

## 2022-08-05 PROCEDURE — 82306 VITAMIN D 25 HYDROXY: CPT | Performed by: INTERNAL MEDICINE

## 2022-08-05 PROCEDURE — 36415 COLL VENOUS BLD VENIPUNCTURE: CPT | Performed by: INTERNAL MEDICINE

## 2022-08-05 PROCEDURE — 99396 PREV VISIT EST AGE 40-64: CPT | Performed by: INTERNAL MEDICINE

## 2022-08-05 PROCEDURE — 82043 UR ALBUMIN QUANTITATIVE: CPT | Performed by: INTERNAL MEDICINE

## 2022-08-05 PROCEDURE — 80053 COMPREHEN METABOLIC PANEL: CPT | Performed by: INTERNAL MEDICINE

## 2022-08-05 PROCEDURE — 85027 COMPLETE CBC AUTOMATED: CPT | Performed by: INTERNAL MEDICINE

## 2022-08-05 PROCEDURE — 81001 URINALYSIS AUTO W/SCOPE: CPT | Performed by: INTERNAL MEDICINE

## 2022-08-05 PROCEDURE — 83036 HEMOGLOBIN GLYCOSYLATED A1C: CPT | Performed by: INTERNAL MEDICINE

## 2022-08-05 PROCEDURE — 80061 LIPID PANEL: CPT | Performed by: INTERNAL MEDICINE

## 2022-08-05 PROCEDURE — G0103 PSA SCREENING: HCPCS | Performed by: INTERNAL MEDICINE

## 2022-08-05 RX ORDER — METFORMIN HCL 500 MG
1000 TABLET, EXTENDED RELEASE 24 HR ORAL DAILY
Qty: 180 TABLET | Refills: 3 | Status: SHIPPED | OUTPATIENT
Start: 2022-08-05 | End: 2023-08-18

## 2022-08-05 RX ORDER — ROSUVASTATIN CALCIUM 10 MG/1
10 TABLET, COATED ORAL DAILY
Qty: 90 TABLET | Refills: 3 | Status: SHIPPED | OUTPATIENT
Start: 2022-08-05 | End: 2023-08-18

## 2022-08-05 ASSESSMENT — ENCOUNTER SYMPTOMS
DIZZINESS: 0
CHILLS: 0
EYE PAIN: 0
JOINT SWELLING: 0
HEADACHES: 0
SORE THROAT: 0
PALPITATIONS: 0
NERVOUS/ANXIOUS: 0
DIARRHEA: 0
FEVER: 0
WEAKNESS: 0
PARESTHESIAS: 0
DYSURIA: 0
HEARTBURN: 0
FREQUENCY: 1
HEMATOCHEZIA: 0
ABDOMINAL PAIN: 0
HEMATURIA: 0
ARTHRALGIAS: 0
SHORTNESS OF BREATH: 0
NAUSEA: 0
COUGH: 0
MYALGIAS: 0
CONSTIPATION: 0

## 2022-08-05 NOTE — PROGRESS NOTES
SUBJECTIVE:   CC: Waldemar Chua is an 62 year old male who presents for preventative health visit.     [id number]-old male here for annual preventive visit and to follow-up medical problems including type 2 diabetes and history of papillary thyroid cancer with secondary hypothyroidism along with hyperlipidemia and BPH.  See assessment plan for details.    Patient has been advised of split billing requirements and indicates understanding: Yes  Healthy Habits:     Getting at least 3 servings of Calcium per day:  Yes    Bi-annual eye exam:  Yes    Dental care twice a year:  NO    Sleep apnea or symptoms of sleep apnea:  Excessive snoring    Diet:  Regular (no restrictions)    Frequency of exercise:  2-3 days/week    Duration of exercise:  15-30 minutes    Taking medications regularly:  Yes    Medication side effects:  None    PHQ-2 Total Score: 0    Additional concerns today:  No      Today's PHQ-2 Score:   PHQ-2 ( 1999 Pfizer) 8/5/2022   Q1: Little interest or pleasure in doing things 0   Q2: Feeling down, depressed or hopeless 0   PHQ-2 Score 0   PHQ-2 Total Score (12-17 Years)- Positive if 3 or more points; Administer PHQ-A if positive -   Q1: Little interest or pleasure in doing things Not at all   Q2: Feeling down, depressed or hopeless Not at all   PHQ-2 Score 0       Abuse: Current or Past(Physical, Sexual or Emotional)- No  Do you feel safe in your environment? Yes        Social History     Tobacco Use     Smoking status: Never Smoker     Smokeless tobacco: Never Used   Substance Use Topics     Alcohol use: Yes     Alcohol/week: 4.0 standard drinks     Types: 4 Standard drinks or equivalent per week       Alcohol Use 8/5/2022   Prescreen: >3 drinks/day or >7 drinks/week? No       Last PSA:   Prostate Specific Antigen Screen   Date Value Ref Range Status   07/30/2021 2.03 0.00 - 4.50 ug/L Final       Reviewed orders with patient. Reviewed health maintenance and updated orders accordingly - Yes  Lab work  is in process    Reviewed and updated as needed this visit by clinical staff   Tobacco  Allergies  Meds  Problems  Med Hx  Surg Hx  Fam Hx            Reviewed and updated as needed this visit by Provider   Tobacco  Allergies  Meds  Problems  Med Hx  Surg Hx  Fam Hx           Past Medical History:   Diagnosis Date     Benign prostatic hyperplasia with urinary frequency 7/30/2021     Chronic hoarseness 2012    Atypical reflux suspected     Chronic right shoulder pain 7/24/2020    Suspect rotator cuff injury.     Daytime hypersomnolence 3/29/2019     Dizziness 1/23/2021     Eczema 6/21/2016     Family history of colonic polyps 3/29/2019     Family history of melanoma 7/24/2020     Hyperlipidemia      Memory changes 1/4/2018     Papillary thyroid carcinoma (H)     PET scan normal 2011, followed by endocrinology, FNA 2012 of inflammatory lymph node     Personal history of colonic polyps 7/24/2020    Colonoscopy May 2019 with removal of polyps.  Repeat in 5 years     Secondary hypothyroidism     Thyroidectomy     Thyroid disease      Type 2 diabetes mellitus without complication, without long-term current use of insulin (H) 3/29/2019     Vitamin D deficiency 3/29/2019      Past Surgical History:   Procedure Laterality Date     FOOT SURGERY       THYROIDECTOMY  05/01/2010    with neck dissection for papillary thyroid cancer       Review of Systems   Constitutional: Negative for chills and fever.   HENT: Negative for congestion, ear pain, hearing loss and sore throat.    Eyes: Negative for pain and visual disturbance.   Respiratory: Negative for cough and shortness of breath.    Cardiovascular: Negative for chest pain, palpitations and peripheral edema.   Gastrointestinal: Negative for abdominal pain, constipation, diarrhea, heartburn, hematochezia and nausea.   Genitourinary: Positive for frequency. Negative for dysuria, genital sores, hematuria, impotence, penile discharge and urgency.   Musculoskeletal:  "Negative for arthralgias, joint swelling and myalgias.   Skin: Negative for rash.   Neurological: Negative for dizziness, weakness, headaches and paresthesias.   Psychiatric/Behavioral: Negative for mood changes. The patient is not nervous/anxious.          OBJECTIVE:   /82   Pulse 60   Temp 98  F (36.7  C)   Ht 1.727 m (5' 8\")   Wt 78.5 kg (173 lb)   BMI 26.30 kg/m      Physical Exam  EYES: Eyelids, conjunctiva, and sclera were normal. Pupils were normal. Cornea, iris, and lens were normal bilaterally.  HEAD, EARS, NOSE, MOUTH, AND THROAT: Head and face were normal. TMs and external auditory canals are normal  NECK: Neck appearance was normal. There were no neck masses and the thyroid was not enlarged and no nodules are felt.  No lymphadenopathy.  RESPIRATORY: Breathing pattern was normal and the chest moved symmetrically.   Lung sounds were normal and there were no rales or wheezes.  CARDIOVASCULAR: Heart rate and rhythm were normal.  S1 and S2 were normal and there were no extra sounds or murmurs. Peripheral pulses in arms and legs were normal.  There was no peripheral edema.  No carotid bruits.  GASTROINTESTINAL:  Bowel sounds were present.   Palpation detected no tenderness, mass, or enlarged organs.   RECTAL/PROSTATE: No external lesions.  Sphincter tone normal.  No palpable rectal lesions.  Prostate moderately enlarged but smooth, nontender without nodules  MUSCULOSKELETAL: Skeletal configuration was normal and muscle mass was normal for age. Joint appearance was overall normal.  LYMPHATIC: There were no enlarged nodes.  SKIN/HAIR/NAILS: Skin color was normal.  There were no concerning skin lesions.  NEUROLOGIC: The patient was alert and oriented to person, place, time, and circumstance. Speech was normal. Cranial nerves were normal. Motor strength was normal for age. The patient was normally coordinated.  Sensation intact.  Diabetic foot exam: Good pedal pulses.  Normal vibratory sensation and " normal monofilament exam  PSYCHIATRIC:  Mood and affect were normal and the patient had normal recent and remote memory. The patient's judgment and insight were normal.        ASSESSMENT/PLAN:   1. Routine general medical examination at a health care facility  Immunizations are reviewed and everything is up-to-date.  Living will discussed.  Information provided.  Non-smoker.  Uses alcohol in moderation.  Regular exercise and healthy diet habits to maintain a healthy weight discussed.  Up to date with colonoscopies and this should be repeated in 2024.  Prostate exam is performed and I will check a PSA for prostate cancer screening.   He sees his ophthalmologist every year.  Regular dental visits every 6 months discussed.  Skin exam performed and recommending regular use of sunblock.  He should see a dermatologist every year.  Hepatitis C antibody for screening was normal.      2. Type 2 diabetes mellitus without complication, without long-term current use of insulin (H)  A1c was not at goal last year at 7.5% and metformin increased to 1000 mg daily.  Currently not checking sugars at home but will recheck A1c today.  He did have his annual diabetic eye exam completed and we need to obtain that report.  Diabetic foot exam performed and no peripheral neuropathy.  Obtain microalbumin.  He takes a statin and aspirin daily.  Weight is stable.  BMI 26.3.  Encouraging regular exercise and continued following of diabetic diet  - HEMOGLOBIN A1C; Future  - Albumin Random Urine Quantitative with Creat Ratio; Future  - metFORMIN (GLUCOPHAGE XR) 500 MG 24 hr tablet; Take 2 tablets (1,000 mg) by mouth daily  Dispense: 180 tablet; Refill: 3  - Comprehensive metabolic panel (BMP + Alb, Alk Phos, ALT, AST, Total. Bili, TP); Future    3. Papillary thyroid carcinoma (H)  History of thyroidectomy.  Followed by endocrinology.  Continues on Synthroid.  - CBC with platelets; Future    4. Secondary hypothyroidism  TSH will be rechecked by  "his endocrinologist.  Continues on Synthroid    5. Hyperlipidemia, unspecified hyperlipidemia type  Recheck lipid profile on rosuvastatin  - rosuvastatin (CRESTOR) 10 MG tablet; Take 1 tablet (10 mg) by mouth daily  Dispense: 90 tablet; Refill: 3  - Lipid Profile (Chol, Trig, HDL, LDL calc); Future    6. Memory changes  Again discussed some mild memory issues that he has noticed.  There has been a few times where he has been told something and does not recall being told this.  Nothing that is affecting his usual ADLs and able to describe all his concerns in great detail making me less concerned that we are dealing with any kind of early cognitive impairment.  With that said, we will continue to monitor.  Vitamin B12 level was normal last year.    7. Benign prostatic hyperplasia with urinary frequency  Some mild BPH symptoms including frequency but this is usually worse when drinking caffeine or alcohol.  We discussed medication such as Flomax but not indicated at this time.  Cutting back on caffeine intake is certainly recommended.  - UA Macro with Reflex to Micro and Culture - lab collect; Future    8. Vitamin D deficiency  Recheck vitamin D level  - Vitamin D deficiency screening; Future    9. Family history of melanoma  He should see a dermatologist every year    10. Personal history of colonic polyps  Colonoscopy every 5 years due again in 2024    11. Screening for prostate cancer    - PSA, screen; Future    Patient has been advised of split billing requirements and indicates understanding: Yes    COUNSELING:   Reviewed preventive health counseling, as reflected in patient instructions       Regular exercise       Healthy diet/nutrition       Vision screening       Colorectal cancer screening       Prostate cancer screening       Advance Care Planning    Estimated body mass index is 26.3 kg/m  as calculated from the following:    Height as of this encounter: 1.727 m (5' 8\").    Weight as of this encounter: 78.5 " kg (173 lb).         He reports that he has never smoked. He has never used smokeless tobacco.      Counseling Resources:  ATP IV Guidelines  Pooled Cohorts Equation Calculator  FRAX Risk Assessment  ICSI Preventive Guidelines  Dietary Guidelines for Americans, 2010  USDA's MyPlate  ASA Prophylaxis  Lung CA Screening    Wiley Francis MD  [facility]

## 2022-08-05 NOTE — PATIENT INSTRUCTIONS
Preventive Health Recommendations  Male Ages 50 - 64    Yearly exam:             See your health care provider every year in order to  o   Review health changes.   o   Discuss preventive care.    o   Review your medicines if your doctor has prescribed any.   Cholesterol screening    PSA annually for prostate cancer screening  You should be tested each year for STDs (sexually transmitted diseases), if you re at risk.     Shots: Get a flu shot each year. Get a tetanus shot every 10 years.     Nutrition:  Eat at least 5 servings of fruits and vegetables daily.   Eat whole-grain bread, whole-wheat pasta and brown rice instead of white grains and rice.   Get adequate Calcium and Vitamin D.     Lifestyle  Exercise for at least 150 minutes a week (30 minutes a day, 5 days a week). This will help you control your weight and prevent disease.   Limit alcohol to one drink per day.   No smoking.   Wear sunscreen to prevent skin cancer.  Continue to see a dermatologist every year.  See your dentist every six months for an exam and cleaning.   See your eye doctor every year

## 2022-08-06 LAB — DEPRECATED CALCIDIOL+CALCIFEROL SERPL-MC: 53 UG/L (ref 20–75)

## 2022-08-08 DIAGNOSIS — E11.9 TYPE 2 DIABETES MELLITUS WITHOUT COMPLICATION, WITHOUT LONG-TERM CURRENT USE OF INSULIN (H): Primary | ICD-10-CM

## 2022-09-18 ENCOUNTER — HEALTH MAINTENANCE LETTER (OUTPATIENT)
Age: 62
End: 2022-09-18

## 2022-09-28 ENCOUNTER — OFFICE VISIT (OUTPATIENT)
Dept: ENDOCRINOLOGY | Facility: CLINIC | Age: 62
End: 2022-09-28
Payer: COMMERCIAL

## 2022-09-28 ENCOUNTER — LAB (OUTPATIENT)
Dept: LAB | Facility: CLINIC | Age: 62
End: 2022-09-28

## 2022-09-28 VITALS
DIASTOLIC BLOOD PRESSURE: 80 MMHG | WEIGHT: 175.6 LBS | SYSTOLIC BLOOD PRESSURE: 138 MMHG | HEART RATE: 66 BPM | BODY MASS INDEX: 26.7 KG/M2

## 2022-09-28 DIAGNOSIS — C73 PAPILLARY THYROID CARCINOMA (H): Primary | ICD-10-CM

## 2022-09-28 DIAGNOSIS — E89.0 POSTSURGICAL HYPOTHYROIDISM: ICD-10-CM

## 2022-09-28 DIAGNOSIS — C73 PAPILLARY THYROID CARCINOMA (H): ICD-10-CM

## 2022-09-28 LAB
T4 FREE SERPL-MCNC: 1.85 NG/DL (ref 0.9–1.7)
TSH SERPL DL<=0.005 MIU/L-ACNC: 0.18 UIU/ML (ref 0.3–4.2)

## 2022-09-28 PROCEDURE — 36415 COLL VENOUS BLD VENIPUNCTURE: CPT

## 2022-09-28 PROCEDURE — 84439 ASSAY OF FREE THYROXINE: CPT

## 2022-09-28 PROCEDURE — 99215 OFFICE O/P EST HI 40 MIN: CPT | Performed by: INTERNAL MEDICINE

## 2022-09-28 PROCEDURE — 84432 ASSAY OF THYROGLOBULIN: CPT | Mod: 90

## 2022-09-28 PROCEDURE — 86800 THYROGLOBULIN ANTIBODY: CPT | Mod: 90

## 2022-09-28 PROCEDURE — 84443 ASSAY THYROID STIM HORMONE: CPT

## 2022-09-28 PROCEDURE — 99000 SPECIMEN HANDLING OFFICE-LAB: CPT

## 2022-09-28 NOTE — LETTER
9/28/2022         RE: Waldemar Chua  [address] [facility] [address]        Dear Colleague,    Thank you for referring your patient, Waldemar Chua, to the [facility]. Please see a copy of my visit note below.      ENDOCRINOLOGY NEW PATIENT VISIT        HISTORY OF PRESENT ILLNESS    Waldemar Chua is seen to establish endocrine care.  Previously followed by Dr. Basurto (last visit in 5/2021) and prior to that followed by Dr. Kunz and Dr. Hadley.  His prior endocrine records were reviewed.    The patient was incidentally discovered to have neck mass on physical exam in 2010.  FNA of the mass, which was found to be a lymph node, was positive for papillary thyroid carcinoma.  He underwent total thyroidectomy with central and right modified neck dissection on 5/21/2010 at [facility].     Surgical pathology showed the following:  -Right lobe papillary thyroid carcinoma, classical type, 1.2 cm in greatest dimension, unifocal and well differentiated.  Margins negative for tumor, 0.1 mm to closest inked margin.  Tumor partially encapsulated.  No capsular, lymphovascular, perineural invasion identified.  No extrathyroidal extension.  -Left lobe of thyroid had benign tissue.  -6 out of 38 lymph nodes examined were positive.  No extranodal extension identified.    The patient received 96.5 mCi of I-131 in 7/2010.  Posttreatment scan showed uptake in the neck only.    PET scan was performed in 2011 due to nationwide shortage of Thyrogen.  PET scan showed no evidence of abnormal activity.    The patient has since had follow-up in endocrinology.    Thyroglobulin antibodies has been negative and thyroglobulin level low, most recently on 5/4/2021.    Imaging has previously revealed an abnormal appearing lymph node: He had a left neck lymph node FNA on 5/15/2012: Mixed lymphoid infiltrate was noted on cytology, with no evidence of malignancy.    Most recent neck  ultrasound on 6/10/2021 showed no neck mass and no suspicious lymphadenopathy, although images were limited to the thyroid bed.    Prior to that, neck ultrasound on 2/4/2020 showed 2 small lymph nodes in the right neck, at level IV 0.9 x 0.4 x 0.8 cm lymph node and at level V a 0.9 x 0.4 x 0.7 lymph node, without clearly suspicious features.    Since last visit with Dr. Basurto in 5/2021, the patient has been doing fairly well.  No change in the feel of his neck.  No dysphagia, hoarseness, or stridor.    He does not have history of excessive head or neck radiation exposure.  No family history of thyroid cancer.    He is taking levothyroxine 137 mcg daily: Typically takes it first thing in the morning, has coffee immediately afterward.  Eats breakfast 1 to 2 hours after taking levothyroxine.    Energy level is quite good.  No change in temperature tolerance.  Bowel movements are regular.  No skin or hair texture changes.    Medical history also includes type 2 diabetes and hyperlipidemia.    Pertinent Social History: , has 4 children and 3 grandchildren; lives in [place].  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    PAST MEDICAL HISTORY  Past Medical History:   Diagnosis Date     Benign prostatic hyperplasia with urinary frequency 7/30/2021     Chronic hoarseness 2012    Atypical reflux suspected     Chronic right shoulder pain 7/24/2020    Suspect rotator cuff injury.     Daytime hypersomnolence 3/29/2019     Dizziness 1/23/2021     Eczema 6/21/2016     Family history of colonic polyps 3/29/2019     Family history of melanoma 7/24/2020     Hyperlipidemia      Memory changes 1/4/2018     Papillary thyroid carcinoma (H)     PET scan normal 2011, followed by endocrinology, FNA 2012 of inflammatory lymph node     Personal history of colonic polyps 7/24/2020    Colonoscopy May 2019 with removal of polyps.  Repeat in 5 years     Secondary hypothyroidism     Thyroidectomy      Thyroid disease      Type 2 diabetes mellitus without complication, without long-term current use of insulin (H) 3/29/2019     Vitamin D deficiency 3/29/2019       MEDICATIONS  Current Outpatient Medications   Medication Sig Dispense Refill     aspirin 81 MG EC tablet Take 81 mg by mouth       cholecalciferol 25 MCG (1000 UT) TABS Take 2,000 Units by mouth       levothyroxine (SYNTHROID/LEVOTHROID) 137 MCG tablet TAKE 1 TABLET BY MOUTH EVERY DAY 90 tablet 3     metFORMIN (GLUCOPHAGE XR) 500 MG 24 hr tablet Take 2 tablets (1,000 mg) by mouth daily 180 tablet 3     rosuvastatin (CRESTOR) 10 MG tablet Take 1 tablet (10 mg) by mouth daily 90 tablet 3     sitagliptin (JANUVIA) 50 MG tablet Take 1 tablet (50 mg) by mouth daily 90 tablet 3       Allergies, family, and social history were reviewed and documented as needed in EHR.     REVIEW OF SYSTEMS  A focused ROS was performed, with pertinent positives and negatives as noted in the HPI.    PHYSICAL EXAM  /80 (BP Location: Right arm, Patient Position: Sitting)   Pulse 66   Wt 79.7 kg (175 lb 9.6 oz)   BMI 26.70 kg/m    Body mass index is 26.7 kg/m .  Constitutional: Vital signs reviewed, as recorded above. Patient is alert, oriented and appears in no acute distress.  Eyes: PER, EOMI, no stare, lid lag, or retraction; no conjunctival injection.  Neck: Neck supple, no palpable thyroid tissue or neck masses.  Lymphatic: No cervical or supraclavicular LAD.  Cardiovascular: RRR, normal S1/S2, no audible murmurs, rubs or gallops; no LE edema.  Respiratory: CTAB, without wheezes, crackles or rhonchi; normal chest wall motion and respiratory effort.  GI: Positive bowel sounds, soft, NT/ND.  MSK: No clubbing or cyanosis; normal muscle bulk and tone.  Skin: Normal skin color, temperature, turgor and texture.  Neurological: Alert and oriented times 3. No tremor.    DATA REVIEW  Each of the following laboratory and/or imaging studies were reviewed.    Prior imaging  reviewed, as summarized in HPI.    Results of thyroglobulin panel and thyroid function test from 5/4/2021 reviewed.    ASSESSMENT  1.  Papillary thyroid carcinoma.  Classical type, T1N1M0, status post total thyroidectomy, central and modified right neck lymph node dissection, and radioiodine therapy without distant metastatic disease noted on posttreatment whole-body scan.  Thus far, has had good response to therapy.  Surveillance after thyroidectomy has revealed low thyroglobulin levels and no evidence of persistent or recurrent structural disease.  Would reevaluate for biochemical and structural evidence of recurrent disease.  Continue thyroid hormone suppression as below.      2.  Hypothyroidism.  Postsurgical.  In low risk group for persistent/recurrent disease based on data we have so far.  Additional surveillance testing as discussed above.  Given low risk status, aim for TSH between lower limit of normal and 2.  We discussed ways to take levothyroxine to maximize its absorption.    3.  Diabetes mellitus.  Well-controlled on current regimen, deferred to PCP.    PLAN  -Labs today  -Continue levothyroxine 137 mcg daily--work on  from coffee by about 30 minutes  -Once thyroid function test results are available, I will send updated refill to [facility] pharmacy (on [address])  -[place] [facility] [place] [address] at convenience  -We will likely check another set of thyroid function tests in 6 months  -Return for a follow-up visit in 1 year (sooner follow-up if needed based on results)  -We will communicate results via [employer], or if needed by phone    Orders Placed This Encounter   Procedures     US Head Neck Soft Tissue     TSH with free T4 reflex     Thyroglobulin and Antibody (Sendout to [facility])       I spent a total of 47 minutes on the date of encounter reviewing medical records, evaluating the patient, coordinating care and documenting in the EHR, as detailed  above.      Jer Kearney MD   Division of Diabetes, Endocrinology and Metabolism  Department of Medicine      cc: Wiley Francis MD               Again, thank you for allowing me to participate in the care of your patient.        Sincerely,        JER Kearney MD

## 2022-09-28 NOTE — PATIENT INSTRUCTIONS
-Labs today  -Continue levothyroxine 137 mcg daily--work on  from coffee by about 30 minutes  -Once thyroid function test results are available, I will send updated refill to [facility] (on [address])  -Schedule neck ultrasound at the [facility] [place] (on [address]) at convenience  -We will likely check another set of thyroid function tests in 6 months  -Return for a follow-up visit in 1 year (sooner follow-up if needed based on results)  -We will communicate results via [employer], or if needed by phone

## 2022-09-28 NOTE — PROGRESS NOTES
ENDOCRINOLOGY NEW PATIENT VISIT        HISTORY OF PRESENT ILLNESS    Waldemar Chua is seen to establish endocrine care.  Previously followed by Dr. Basurto (last visit in 5/2021) and prior to that followed by Dr. Kunz and Dr. Hadley.  His prior endocrine records were reviewed.    The patient was incidentally discovered to have neck mass on physical exam in 2010.  FNA of the mass, which was found to be a lymph node, was positive for papillary thyroid carcinoma.  He underwent total thyroidectomy with central and right modified neck dissection on 5/21/2010 at [facility].     Surgical pathology showed the following:  -Right lobe papillary thyroid carcinoma, classical type, 1.2 cm in greatest dimension, unifocal and well differentiated.  Margins negative for tumor, 0.1 mm to closest inked margin.  Tumor partially encapsulated.  No capsular, lymphovascular, perineural invasion identified.  No extrathyroidal extension.  -Left lobe of thyroid had benign tissue.  -6 out of 38 lymph nodes examined were positive.  No extranodal extension identified.    The patient received 96.5 mCi of I-131 in 7/2010.  Posttreatment scan showed uptake in the neck only.    PET scan was performed in 2011 due to nationwide shortage of Thyrogen.  PET scan showed no evidence of abnormal activity.    The patient has since had follow-up in endocrinology.    Thyroglobulin antibodies has been negative and thyroglobulin level low, most recently on 5/4/2021.    Imaging has previously revealed an abnormal appearing lymph node: He had a left neck lymph node FNA on 5/15/2012: Mixed lymphoid infiltrate was noted on cytology, with no evidence of malignancy.    Most recent neck ultrasound on 6/10/2021 showed no neck mass and no suspicious lymphadenopathy, although images were limited to the thyroid bed.    Prior to that, neck ultrasound on 2/4/2020 showed 2 small lymph nodes in the right neck, at level IV 0.9 x 0.4 x 0.8 cm lymph node and  at level V a 0.9 x 0.4 x 0.7 lymph node, without clearly suspicious features.    Since last visit with Dr. Basurto in 5/2021, the patient has been doing fairly well.  No change in the feel of his neck.  No dysphagia, hoarseness, or stridor.    He does not have history of excessive head or neck radiation exposure.  No family history of thyroid cancer.    He is taking levothyroxine 137 mcg daily: Typically takes it first thing in the morning, has coffee immediately afterward.  Eats breakfast 1 to 2 hours after taking levothyroxine.    Energy level is quite good.  No change in temperature tolerance.  Bowel movements are regular.  No skin or hair texture changes.    Medical history also includes type 2 diabetes and hyperlipidemia.    Pertinent Social History: , has 4 children and 3 grandchildren; lives in [facility] [place] [facility].  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    PAST MEDICAL HISTORY  Past Medical History:   Diagnosis Date     Benign prostatic hyperplasia with urinary frequency 7/30/2021     Chronic hoarseness 2012    Atypical reflux suspected     Chronic right shoulder pain 7/24/2020    Suspect rotator cuff injury.     Daytime hypersomnolence 3/29/2019     Dizziness 1/23/2021     Eczema 6/21/2016     Family history of colonic polyps 3/29/2019     Family history of melanoma 7/24/2020     Hyperlipidemia      Memory changes 1/4/2018     Papillary thyroid carcinoma (H)     PET scan normal 2011, followed by endocrinology, FNA 2012 of inflammatory lymph node     Personal history of colonic polyps 7/24/2020    Colonoscopy May 2019 with removal of polyps.  Repeat in 5 years     Secondary hypothyroidism     Thyroidectomy     Thyroid disease      Type 2 diabetes mellitus without complication, without long-term current use of insulin (H) 3/29/2019     Vitamin D deficiency 3/29/2019       MEDICATIONS  Current Outpatient Medications   Medication Sig Dispense Refill     aspirin 81 MG EC  tablet Take 81 mg by mouth       cholecalciferol 25 MCG (1000 UT) TABS Take 2,000 Units by mouth       levothyroxine (SYNTHROID/LEVOTHROID) 137 MCG tablet TAKE 1 TABLET BY MOUTH EVERY DAY 90 tablet 3     metFORMIN (GLUCOPHAGE XR) 500 MG 24 hr tablet Take 2 tablets (1,000 mg) by mouth daily 180 tablet 3     rosuvastatin (CRESTOR) 10 MG tablet Take 1 tablet (10 mg) by mouth daily 90 tablet 3     sitagliptin (JANUVIA) 50 MG tablet Take 1 tablet (50 mg) by mouth daily 90 tablet 3       Allergies, family, and social history were reviewed and documented as needed in EHR.     REVIEW OF SYSTEMS  A focused ROS was performed, with pertinent positives and negatives as noted in the HPI.    PHYSICAL EXAM  /80 (BP Location: Right arm, Patient Position: Sitting)   Pulse 66   Wt 79.7 kg (175 lb 9.6 oz)   BMI 26.70 kg/m    Body mass index is 26.7 kg/m .  Constitutional: Vital signs reviewed, as recorded above. Patient is alert, oriented and appears in no acute distress.  Eyes: PER, EOMI, no stare, lid lag, or retraction; no conjunctival injection.  Neck: Neck supple, no palpable thyroid tissue or neck masses.  Lymphatic: No cervical or supraclavicular LAD.  Cardiovascular: RRR, normal S1/S2, no audible murmurs, rubs or gallops; no LE edema.  Respiratory: CTAB, without wheezes, crackles or rhonchi; normal chest wall motion and respiratory effort.  GI: Positive bowel sounds, soft, NT/ND.  MSK: No clubbing or cyanosis; normal muscle bulk and tone.  Skin: Normal skin color, temperature, turgor and texture.  Neurological: Alert and oriented times 3. No tremor.    DATA REVIEW  Each of the following laboratory and/or imaging studies were reviewed.    Prior imaging reviewed, as summarized in HPI.    Results of thyroglobulin panel and thyroid function test from 5/4/2021 reviewed.    ASSESSMENT  1.  Papillary thyroid carcinoma.  Classical type, T1N1M0, status post total thyroidectomy, central and modified right neck lymph node  dissection, and radioiodine therapy without distant metastatic disease noted on posttreatment whole-body scan.  Thus far, has had good response to therapy.  Surveillance after thyroidectomy has revealed low thyroglobulin levels and no evidence of persistent or recurrent structural disease.  Would reevaluate for biochemical and structural evidence of recurrent disease.  Continue thyroid hormone suppression as below.      2.  Hypothyroidism.  Postsurgical.  In low risk group for persistent/recurrent disease based on data we have so far.  Additional surveillance testing as discussed above.  Given low risk status, aim for TSH between lower limit of normal and 2.  We discussed ways to take levothyroxine to maximize its absorption.    3.  Diabetes mellitus.  Well-controlled on current regimen, deferred to PCP.    PLAN  -Labs today  -Continue levothyroxine 137 mcg daily--work on  from coffee by about 30 minutes  -Once thyroid function test results are available, I will send updated refill to [facility] pharmacy (on [address])  -[place] [facility] [place] [address]) at convenience  -We will likely check another set of thyroid function tests in 6 months  -Return for a follow-up visit in 1 year (sooner follow-up if needed based on results)  -We will communicate results via [employer], or if needed by phone    Orders Placed This Encounter   Procedures     US Head Neck Soft Tissue     TSH with free T4 reflex     Thyroglobulin and Antibody (Sendout to [facility])       I spent a total of 47 minutes on the date of encounter reviewing medical records, evaluating the patient, coordinating care and documenting in the EHR, as detailed above.      Efe Kearney MD   Division of Diabetes, Endocrinology and Metabolism  Department of Medicine      cc: Wiley Francis MD

## 2022-10-04 RX ORDER — LEVOTHYROXINE SODIUM 125 UG/1
125 TABLET ORAL DAILY
Qty: 90 TABLET | Refills: 1 | Status: SHIPPED | OUTPATIENT
Start: 2022-10-04 | End: 2023-02-14

## 2022-10-05 LAB — SCANNED LAB RESULT: NORMAL

## 2022-10-06 ENCOUNTER — ANCILLARY PROCEDURE (OUTPATIENT)
Dept: ULTRASOUND IMAGING | Facility: CLINIC | Age: 62
End: 2022-10-06
Attending: INTERNAL MEDICINE
Payer: COMMERCIAL

## 2022-10-06 DIAGNOSIS — C73 PAPILLARY THYROID CARCINOMA (H): ICD-10-CM

## 2022-10-06 DIAGNOSIS — E89.0 POSTSURGICAL HYPOTHYROIDISM: ICD-10-CM

## 2022-10-06 PROCEDURE — 76536 US EXAM OF HEAD AND NECK: CPT | Mod: GC | Performed by: RADIOLOGY

## 2022-11-03 ENCOUNTER — TRANSFERRED RECORDS (OUTPATIENT)
Dept: HEALTH INFORMATION MANAGEMENT | Facility: CLINIC | Age: 62
End: 2022-11-03

## 2022-11-10 ENCOUNTER — LAB (OUTPATIENT)
Dept: LAB | Facility: CLINIC | Age: 62
End: 2022-11-10
Payer: COMMERCIAL

## 2022-11-10 DIAGNOSIS — E11.9 TYPE 2 DIABETES MELLITUS WITHOUT COMPLICATION, WITHOUT LONG-TERM CURRENT USE OF INSULIN (H): ICD-10-CM

## 2022-11-10 LAB — HBA1C MFR BLD: 6.9 % (ref 0–5.6)

## 2022-11-10 PROCEDURE — 36415 COLL VENOUS BLD VENIPUNCTURE: CPT

## 2022-11-10 PROCEDURE — 83036 HEMOGLOBIN GLYCOSYLATED A1C: CPT

## 2023-02-14 DIAGNOSIS — C73 PAPILLARY THYROID CARCINOMA (H): ICD-10-CM

## 2023-02-14 DIAGNOSIS — E89.0 POSTSURGICAL HYPOTHYROIDISM: ICD-10-CM

## 2023-02-14 RX ORDER — LEVOTHYROXINE SODIUM 125 UG/1
125 TABLET ORAL DAILY
Qty: 90 TABLET | Refills: 1 | Status: SHIPPED | OUTPATIENT
Start: 2023-02-14 | End: 2023-09-27

## 2023-06-04 ENCOUNTER — HEALTH MAINTENANCE LETTER (OUTPATIENT)
Age: 63
End: 2023-06-04

## 2023-08-05 DIAGNOSIS — E11.9 TYPE 2 DIABETES MELLITUS WITHOUT COMPLICATION, WITHOUT LONG-TERM CURRENT USE OF INSULIN (H): ICD-10-CM

## 2023-08-05 NOTE — TELEPHONE ENCOUNTER
"Routing refill request to provider for review/approval because:  Labs not current:  A1C  Last Written Prescription Date:  8/8/2022  Last Fill Quantity: 90,  # refills: 3   Last office visit provider:  8/5/2022 with PCP Dr CLEMENCIA Byrd      Requested Prescriptions   Pending Prescriptions Disp Refills    JANUVIA 50 MG tablet [Pharmacy Med Name: JANUVIA 50MG TABLETS] 90 tablet 3     Sig: TAKE 1 TABLET(50 MG) BY MOUTH DAILY       DPP4 Inhibitors Protocol Failed - 8/5/2023  4:04 AM        Failed - HgbA1C in past 3 or 6 months     If HgbA1C is 8 or greater, it needs to be on file within the past 3 months.  If less than 8, must be on file within the past 6 months.     Recent Labs   Lab Test 11/10/22  0932   A1C 6.9*             Passed - Medication is active on med list        Passed - Patient is age 18 or older        Passed - Normal serum creatinine in past 12 months     Recent Labs   Lab Test 08/05/22  1027   CR 0.92       Ok to refill medication if creatinine is low          Passed - Recent (6 mo) or future (30 days) visit within the authorizing provider's specialty     Patient had office visit in the last 6 months or has a visit in the next 30 days with authorizing provider.  See \"Patient Info\" tab in inbasket, or \"Choose Columns\" in Meds & Orders section of the refill encounter.                 Inez Varela RN 08/05/23 6:07 PM  "

## 2023-08-07 RX ORDER — SITAGLIPTIN 50 MG/1
50 TABLET, FILM COATED ORAL DAILY
Qty: 90 TABLET | Refills: 3 | Status: SHIPPED | OUTPATIENT
Start: 2023-08-07 | End: 2023-08-17

## 2023-08-14 ENCOUNTER — LAB (OUTPATIENT)
Dept: LAB | Facility: CLINIC | Age: 63
End: 2023-08-14
Payer: COMMERCIAL

## 2023-08-14 DIAGNOSIS — E11.9 TYPE 2 DIABETES MELLITUS WITHOUT COMPLICATION, WITHOUT LONG-TERM CURRENT USE OF INSULIN (H): ICD-10-CM

## 2023-08-14 DIAGNOSIS — E89.0 POSTSURGICAL HYPOTHYROIDISM: ICD-10-CM

## 2023-08-14 DIAGNOSIS — C73 PAPILLARY THYROID CARCINOMA (H): ICD-10-CM

## 2023-08-14 LAB
ANION GAP SERPL CALCULATED.3IONS-SCNC: 10 MMOL/L (ref 7–15)
BUN SERPL-MCNC: 13.1 MG/DL (ref 8–23)
CALCIUM SERPL-MCNC: 9.6 MG/DL (ref 8.8–10.2)
CHLORIDE SERPL-SCNC: 100 MMOL/L (ref 98–107)
CHOLEST SERPL-MCNC: 182 MG/DL
CREAT SERPL-MCNC: 0.94 MG/DL (ref 0.67–1.17)
CREAT UR-MCNC: 45.1 MG/DL
DEPRECATED HCO3 PLAS-SCNC: 27 MMOL/L (ref 22–29)
GFR SERPL CREATININE-BSD FRML MDRD: >90 ML/MIN/1.73M2
GLUCOSE SERPL-MCNC: 184 MG/DL (ref 70–99)
HBA1C MFR BLD: 7.4 % (ref 0–5.6)
HDLC SERPL-MCNC: 62 MG/DL
LDLC SERPL CALC-MCNC: 92 MG/DL
MICROALBUMIN UR-MCNC: <12 MG/L
MICROALBUMIN/CREAT UR: NORMAL MG/G{CREAT}
NONHDLC SERPL-MCNC: 120 MG/DL
POTASSIUM SERPL-SCNC: 4.8 MMOL/L (ref 3.4–5.3)
SODIUM SERPL-SCNC: 137 MMOL/L (ref 136–145)
TRIGL SERPL-MCNC: 140 MG/DL
TSH SERPL DL<=0.005 MIU/L-ACNC: 0.66 UIU/ML (ref 0.3–4.2)

## 2023-08-14 PROCEDURE — 82570 ASSAY OF URINE CREATININE: CPT

## 2023-08-14 PROCEDURE — 36415 COLL VENOUS BLD VENIPUNCTURE: CPT

## 2023-08-14 PROCEDURE — 82043 UR ALBUMIN QUANTITATIVE: CPT

## 2023-08-14 PROCEDURE — 80061 LIPID PANEL: CPT

## 2023-08-14 PROCEDURE — 80048 BASIC METABOLIC PNL TOTAL CA: CPT

## 2023-08-14 PROCEDURE — 83036 HEMOGLOBIN GLYCOSYLATED A1C: CPT

## 2023-08-14 PROCEDURE — 84443 ASSAY THYROID STIM HORMONE: CPT

## 2023-08-17 ENCOUNTER — OFFICE VISIT (OUTPATIENT)
Dept: INTERNAL MEDICINE | Facility: CLINIC | Age: 63
End: 2023-08-17
Payer: COMMERCIAL

## 2023-08-17 VITALS
OXYGEN SATURATION: 99 % | BODY MASS INDEX: 26.83 KG/M2 | TEMPERATURE: 98.2 F | RESPIRATION RATE: 16 BRPM | HEART RATE: 78 BPM | SYSTOLIC BLOOD PRESSURE: 118 MMHG | DIASTOLIC BLOOD PRESSURE: 80 MMHG | WEIGHT: 177 LBS | HEIGHT: 68 IN

## 2023-08-17 DIAGNOSIS — N40.1 BENIGN PROSTATIC HYPERPLASIA WITH URINARY FREQUENCY: ICD-10-CM

## 2023-08-17 DIAGNOSIS — Z51.81 ENCOUNTER FOR THERAPEUTIC DRUG MONITORING: ICD-10-CM

## 2023-08-17 DIAGNOSIS — Z00.00 ROUTINE GENERAL MEDICAL EXAMINATION AT A HEALTH CARE FACILITY: Primary | ICD-10-CM

## 2023-08-17 DIAGNOSIS — E78.5 HYPERLIPIDEMIA, UNSPECIFIED HYPERLIPIDEMIA TYPE: ICD-10-CM

## 2023-08-17 DIAGNOSIS — Z80.8 FAMILY HISTORY OF MELANOMA: ICD-10-CM

## 2023-08-17 DIAGNOSIS — C73 PAPILLARY THYROID CARCINOMA (H): ICD-10-CM

## 2023-08-17 DIAGNOSIS — Z86.0100 PERSONAL HISTORY OF COLONIC POLYPS: ICD-10-CM

## 2023-08-17 DIAGNOSIS — E03.8 SECONDARY HYPOTHYROIDISM: ICD-10-CM

## 2023-08-17 DIAGNOSIS — E55.9 VITAMIN D DEFICIENCY: ICD-10-CM

## 2023-08-17 DIAGNOSIS — R41.3 MEMORY CHANGES: ICD-10-CM

## 2023-08-17 DIAGNOSIS — E11.9 TYPE 2 DIABETES MELLITUS WITHOUT COMPLICATION, WITHOUT LONG-TERM CURRENT USE OF INSULIN (H): ICD-10-CM

## 2023-08-17 DIAGNOSIS — R35.0 BENIGN PROSTATIC HYPERPLASIA WITH URINARY FREQUENCY: ICD-10-CM

## 2023-08-17 LAB
ERYTHROCYTE [DISTWIDTH] IN BLOOD BY AUTOMATED COUNT: 12.2 % (ref 10–15)
HCT VFR BLD AUTO: 41.6 % (ref 40–53)
HGB BLD-MCNC: 14 G/DL (ref 13.3–17.7)
MCH RBC QN AUTO: 29 PG (ref 26.5–33)
MCHC RBC AUTO-ENTMCNC: 33.7 G/DL (ref 31.5–36.5)
MCV RBC AUTO: 86 FL (ref 78–100)
PLATELET # BLD AUTO: 208 10E3/UL (ref 150–450)
RBC # BLD AUTO: 4.83 10E6/UL (ref 4.4–5.9)
WBC # BLD AUTO: 6.8 10E3/UL (ref 4–11)

## 2023-08-17 PROCEDURE — 99396 PREV VISIT EST AGE 40-64: CPT | Performed by: INTERNAL MEDICINE

## 2023-08-17 PROCEDURE — 80076 HEPATIC FUNCTION PANEL: CPT | Performed by: INTERNAL MEDICINE

## 2023-08-17 PROCEDURE — 36415 COLL VENOUS BLD VENIPUNCTURE: CPT | Performed by: INTERNAL MEDICINE

## 2023-08-17 PROCEDURE — G0103 PSA SCREENING: HCPCS | Performed by: INTERNAL MEDICINE

## 2023-08-17 PROCEDURE — 85027 COMPLETE CBC AUTOMATED: CPT | Performed by: INTERNAL MEDICINE

## 2023-08-17 PROCEDURE — 82306 VITAMIN D 25 HYDROXY: CPT | Performed by: INTERNAL MEDICINE

## 2023-08-17 PROCEDURE — 99214 OFFICE O/P EST MOD 30 MIN: CPT | Mod: 25 | Performed by: INTERNAL MEDICINE

## 2023-08-17 ASSESSMENT — ENCOUNTER SYMPTOMS
ABDOMINAL PAIN: 0
NAUSEA: 0
DIZZINESS: 0
DIARRHEA: 0
MYALGIAS: 0
HEMATOCHEZIA: 0
COUGH: 0
EYE PAIN: 0
PARESTHESIAS: 0
NERVOUS/ANXIOUS: 0
FREQUENCY: 0
FEVER: 0
HEARTBURN: 0
CONSTIPATION: 0
HEADACHES: 0
SHORTNESS OF BREATH: 0
JOINT SWELLING: 0
ARTHRALGIAS: 0
PALPITATIONS: 0
HEMATURIA: 0
CHILLS: 0
WEAKNESS: 0
DYSURIA: 0
SORE THROAT: 0

## 2023-08-17 NOTE — PROGRESS NOTES
SUBJECTIVE:   CC: Rocky is an 63 year old who presents for preventative health visit.     63-year-old male here for his annual preventive visit and follow-up medical problems including type 2 diabetes, papillary thyroid carcinoma, hyperlipidemia and other concerns.  See assessment and plan for details.      8/17/2023     4:46 PM   Additional Questions   Roomed by        Healthy Habits:     Getting at least 3 servings of Calcium per day:  Yes    Bi-annual eye exam:  Yes    Dental care twice a year:  NO    Sleep apnea or symptoms of sleep apnea:  Excessive snoring    Diet:  Regular (no restrictions)    Frequency of exercise:  None    Taking medications regularly:  Yes    Medication side effects:  None    Additional concerns today:  No      Today's PHQ-2 Score:       8/17/2023     4:12 PM   PHQ-2 ( 1999 [employer])   Q1: Little interest or pleasure in doing things 0   Q2: Feeling down, depressed or hopeless 0   PHQ-2 Score 0   Q1: Little interest or pleasure in doing things Not at all   Q2: Feeling down, depressed or hopeless Not at all   PHQ-2 Score 0                       Social History     Tobacco Use    Smoking status: Never    Smokeless tobacco: Never   Substance Use Topics    Alcohol use: Yes     Alcohol/week: 4.0 standard drinks of alcohol     Types: 4 Standard drinks or equivalent per week             8/17/2023     4:12 PM   Alcohol Use   Prescreen: >3 drinks/day or >7 drinks/week? No       Last PSA:   Prostate Specific Antigen Screen   Date Value Ref Range Status   08/05/2022 2.02 0.00 - 4.50 ng/mL Final   07/30/2021 2.03 0.00 - 4.50 ug/L Final       Reviewed orders with patient. Reviewed health maintenance and updated orders accordingly - Yes  Lab work is in process    Reviewed and updated as needed this visit by clinical staff   Tobacco  Allergies  Meds  Problems  Med Hx  Surg Hx  Fam Hx          Reviewed and updated as needed this visit by Provider   Tobacco  Allergies  Meds  Problems  Med Hx  Surg  Hx  Fam Hx         Past Medical History:   Diagnosis Date    Benign prostatic hyperplasia with urinary frequency 7/30/2021    Chronic hoarseness 2012    Atypical reflux suspected    Chronic right shoulder pain 7/24/2020    Suspect rotator cuff injury.    Daytime hypersomnolence 3/29/2019    Dizziness 1/23/2021    Eczema 6/21/2016    Family history of colonic polyps 3/29/2019    Family history of melanoma 7/24/2020    Hyperlipidemia     Memory changes 1/4/2018    Papillary thyroid carcinoma (H)     PET scan normal 2011, followed by endocrinology, FNA 2012 of inflammatory lymph node    Personal history of colonic polyps 7/24/2020    Colonoscopy May 2019 with removal of polyps.  Repeat in 5 years    Secondary hypothyroidism     Thyroidectomy    Thyroid disease     Type 2 diabetes mellitus without complication, without long-term current use of insulin (H) 3/29/2019    Vitamin D deficiency 3/29/2019      Past Surgical History:   Procedure Laterality Date    FOOT SURGERY      THYROIDECTOMY  05/01/2010    with neck dissection for papillary thyroid cancer       Review of Systems   Constitutional:  Negative for chills and fever.   HENT:  Negative for congestion, ear pain, hearing loss and sore throat.    Eyes:  Negative for pain and visual disturbance.   Respiratory:  Negative for cough and shortness of breath.    Cardiovascular:  Negative for chest pain, palpitations and peripheral edema.   Gastrointestinal:  Negative for abdominal pain, constipation, diarrhea, heartburn, hematochezia and nausea.   Genitourinary:  Negative for dysuria, frequency, genital sores, hematuria and urgency.   Musculoskeletal:  Negative for arthralgias, joint swelling and myalgias.   Skin:  Negative for rash.   Neurological:  Negative for dizziness, weakness, headaches and paresthesias.   Psychiatric/Behavioral:  Negative for mood changes. The patient is not nervous/anxious.          OBJECTIVE:   /80 (BP Location: Right arm, Patient  "Position: Sitting, Cuff Size: Adult Regular)   Pulse 78   Temp 98.2  F (36.8  C) (Oral)   Resp 16   Ht 1.721 m (5' 7.75\")   Wt 80.3 kg (177 lb)   SpO2 99%   BMI 27.11 kg/m      Physical Exam  EYES: Eyelids, conjunctiva, and sclera were normal. Pupils were normal. Cornea, iris, and lens were normal bilaterally.  HEAD, EARS, NOSE, MOUTH, AND THROAT: Head and face were normal. TMs and external auditory canals are normal  NECK: Neck appearance was normal. There were no neck masses and the thyroid was not enlarged and no nodules are felt.  No lymphadenopathy.  RESPIRATORY: Breathing pattern was normal and the chest moved symmetrically.   Lung sounds were normal and there were no rales or wheezes.  CARDIOVASCULAR: Heart rate and rhythm were normal.  S1 and S2 were normal and there were no extra sounds or murmurs. Peripheral pulses in arms and legs were normal.  There was no peripheral edema.  No carotid bruits.  GASTROINTESTINAL:  Bowel sounds were present.   Palpation detected no tenderness, mass, or enlarged organs.   RECTAL/PROSTATE: No external lesions.  Sphincter tone normal.  No palpable rectal lesions.  Prostate normal size, smooth, nontender without nodules  MUSCULOSKELETAL: Skeletal configuration was normal and muscle mass was normal for age. Joint appearance was overall normal.  LYMPHATIC: There were no enlarged nodes.  SKIN/HAIR/NAILS: Skin color was normal.  There were no concerning skin lesions.  NEUROLOGIC: The patient was alert and oriented to person, place, time, and circumstance. Speech was normal. Cranial nerves were normal. Motor strength was normal for age. The patient was normally coordinated.  Sensation intact.  Diabetic foot exam: Normal monofilament exam and normal vibratory sensation.  Good pedal pulses.  No other abnormalities.  PSYCHIATRIC:  Mood and affect were normal and the patient had normal recent and remote memory. The patient's judgment and insight were normal. "         ASSESSMENT/PLAN:   1. Routine general medical examination at a health care facility  Immunizations are reviewed and recommending flu shot along with new COVID vaccine and RSV vaccine this fall when available.  Non-smoker.  Uses alcohol in moderation.  Regular exercise and healthy diet habits to maintain a healthy weight discussed.  Up to date with colonoscopies and this should be repeated in May 2024.  Prostate exam is performed and I will check a PSA for prostate cancer screening.   He sees his ophthalmologist every year.  Regular dental visits every 6 months discussed.  Skin exam performed and recommending regular use of sunblock.  He sees his dermatologist every year.  Hepatitis C antibody for screening was normal.      2. Type 2 diabetes mellitus without complication, without long-term current use of insulin (H)  Worsening diabetes control with his A1c now at 7.4% despite taking 1000 mg of metformin XR daily and Januvia 50 mg daily.  We discussed diet modification and getting more regular exercise.  I think you would benefit from a referral to our diabetes educators to help in this effort.  I would also recommend increasing Januvia to 100 mg daily.  He does get an annual diabetic eye exam completed and that report needs to be obtained.  Diabetic foot exam performed.  Microalbumin is checked.  He takes rosuvastatin and aspirin daily.  - sitagliptin (JANUVIA) 100 MG tablet; Take 1 tablet (100 mg) by mouth daily  Dispense: 90 tablet; Refill: 3  - AMB Adult Diabetes Educator Referral; Future    3. Papillary thyroid carcinoma (H)  History of thyroidectomy.  Followed by endocrinology with follow-up appointment in late September.    4. Secondary hypothyroidism  He continues on Synthroid and his TSH is within therapeutic range    5. Hyperlipidemia, unspecified hyperlipidemia type  He continues on rosuvastatin.  Lipid profile reviewed and demonstrating excellent control of cholesterol with LDL under 100.    6.  "Memory changes  We again discussed some of the memory changes that he has noticed.  There are times when he will ask a question and be told by his wife that he was just told that answer.  Normal vitamin B12 level.  This needs to be monitored.  Hopefully this is more the result of him being distracted by other higher priority things in life.  He is retiring next year and will be interesting to see if symptoms are partially alleviated.    7. Vitamin D deficiency  Rechecking vitamin D level on replacement  - Vitamin D deficiency screening; Future    8. Benign prostatic hyperplasia with urinary frequency  Symptoms are stable    9. Family history of melanoma  He sees a dermatologist every year    10. Personal history of colonic polyps  Colonoscopy will be due next year    11. Encounter for therapeutic drug monitoring  Monitor CBC while on aspirin and LFTs while on statin  - CBC with platelets; Future  - Hepatic panel (Albumin, ALT, AST, Bili, Alk Phos, TP); Future     Patient has been advised of split billing requirements and indicates understanding: Yes        BMI:   Estimated body mass index is 27.11 kg/m  as calculated from the following:    Height as of this encounter: 1.721 m (5' 7.75\").    Weight as of this encounter: 80.3 kg (177 lb).         He reports that he has never smoked. He has never used smokeless tobacco.            Wiley Francis MD  [facility]  "

## 2023-08-17 NOTE — PATIENT INSTRUCTIONS
Preventive Health Recommendations  Male Ages 50 - 64    Yearly exam:             See your health care provider every year in order to  o   Review health changes.   o   Discuss preventive care.    o   Review your medicines if your doctor has prescribed any.   Cholesterol annually    Colonoscopy will be due May 2024  PSA annually for prostate cancer screening  You should be tested each year for STDs (sexually transmitted diseases), if you re at risk.     Shots: Get a flu shot each year. Get a tetanus shot every 10 years.  New COVID vaccine and new RSV vaccine this fall when both are available.    Nutrition:  Eat at least 5 servings of fruits and vegetables daily.   Eat whole-grain bread, whole-wheat pasta and brown rice instead of white grains and rice.   Get adequate Calcium and Vitamin D.     Lifestyle  Exercise for at least 150 minutes a week (30 minutes a day, 5 days a week). This will help you control your weight and prevent disease.   Limit alcohol to one drink per day.   No smoking.   Wear sunscreen to prevent skin cancer.  Continue to see your dermatologist every year.  See your dentist every six months for an exam and cleaning.   See your eye doctor every year.

## 2023-08-18 DIAGNOSIS — E78.5 HYPERLIPIDEMIA, UNSPECIFIED HYPERLIPIDEMIA TYPE: ICD-10-CM

## 2023-08-18 DIAGNOSIS — E11.9 TYPE 2 DIABETES MELLITUS WITHOUT COMPLICATION, WITHOUT LONG-TERM CURRENT USE OF INSULIN (H): ICD-10-CM

## 2023-08-18 LAB
ALBUMIN SERPL BCG-MCNC: 5 G/DL (ref 3.5–5.2)
ALP SERPL-CCNC: 66 U/L (ref 40–129)
ALT SERPL W P-5'-P-CCNC: 25 U/L (ref 0–70)
AST SERPL W P-5'-P-CCNC: 30 U/L (ref 0–45)
BILIRUB DIRECT SERPL-MCNC: <0.2 MG/DL (ref 0–0.3)
BILIRUB SERPL-MCNC: 0.4 MG/DL
DEPRECATED CALCIDIOL+CALCIFEROL SERPL-MC: 59 UG/L (ref 20–75)
PROT SERPL-MCNC: 7.5 G/DL (ref 6.4–8.3)
PSA SERPL DL<=0.01 NG/ML-MCNC: 1.87 NG/ML (ref 0–4.5)

## 2023-08-18 RX ORDER — METFORMIN HCL 500 MG
1000 TABLET, EXTENDED RELEASE 24 HR ORAL DAILY
Qty: 180 TABLET | Refills: 1 | Status: SHIPPED | OUTPATIENT
Start: 2023-08-18 | End: 2024-02-08

## 2023-08-18 RX ORDER — ROSUVASTATIN CALCIUM 10 MG/1
10 TABLET, COATED ORAL DAILY
Qty: 90 TABLET | Refills: 3 | Status: SHIPPED | OUTPATIENT
Start: 2023-08-18 | End: 2024-08-06

## 2023-08-18 NOTE — TELEPHONE ENCOUNTER
"  Last Written Prescription Date:  08/05/2022  Last Fill Quantity: 180,  # refills: 3   Last office visit provider:  08/17/2023     Requested Prescriptions   Pending Prescriptions Disp Refills    metFORMIN (GLUCOPHAGE XR) 500 MG 24 hr tablet [Pharmacy Med Name: METFORMIN ER 500MG 24HR TABS] 180 tablet 3     Sig: TAKE 2 TABLETS(1000 MG) BY MOUTH DAILY       Biguanide Agents Failed - 8/18/2023 11:43 AM        Failed - Recent (6 mo) or future (30 days) visit within the authorizing provider's specialty     Patient had office visit in the last 6 months or has a visit in the next 30 days with authorizing provider or within the authorizing provider's specialty.  See \"Patient Info\" tab in inbasket, or \"Choose Columns\" in Meds & Orders section of the refill encounter.            Passed - Patient is age 10 or older        Passed - Patient has documented A1c within the specified period of time.     If HgbA1C is 8 or greater, it needs to be on file within the past 3 months.  If less than 8, must be on file within the past 6 months.     Recent Labs   Lab Test 08/14/23 0919   A1C 7.4*             Passed - Patient's CR is NOT>1.4 OR Patient's EGFR is NOT<45 within past 12 mos.     Recent Labs   Lab Test 08/14/23 0919 07/30/21  1029 07/24/20  1454   GFRESTIMATED >90   < > >60   GFRESTBLACK  --   --  >60    < > = values in this interval not displayed.       Recent Labs   Lab Test 08/14/23 0919   CR 0.94             Passed - Patient does NOT have a diagnosis of CHF.        Passed - Medication is active on med list          Last Written Prescription Date:  08/05/2022  Last Fill Quantity: 90,  # refills: 3   Last office visit provider:  08/17/2023       rosuvastatin (CRESTOR) 10 MG tablet [Pharmacy Med Name: ROSUVASTATIN 10MG TABLETS] 90 tablet 3     Sig: TAKE 1 TABLET(10 MG) BY MOUTH DAILY       Statins Protocol Failed - 8/18/2023 11:43 AM        Failed - Recent (12 mo) or future (30 days) visit within the authorizing provider's " "specialty     Patient has had an office visit with the authorizing provider or a provider within the authorizing providers department within the previous 12 mos or has a future within next 30 days. See \"Patient Info\" tab in inbasket, or \"Choose Columns\" in Meds & Orders section of the refill encounter.              Passed - LDL on file in past 12 months     Recent Labs   Lab Test 08/14/23  0919   LDL 92             Passed - No abnormal creatine kinase in past 12 months     No lab results found.             Passed - Medication is active on med list        Passed - Patient is age 18 or older             Gisela Vale RN 08/18/23 11:43 AM  "

## 2023-09-27 ENCOUNTER — OFFICE VISIT (OUTPATIENT)
Dept: ENDOCRINOLOGY | Facility: CLINIC | Age: 63
End: 2023-09-27
Payer: COMMERCIAL

## 2023-09-27 VITALS
WEIGHT: 176.9 LBS | SYSTOLIC BLOOD PRESSURE: 120 MMHG | DIASTOLIC BLOOD PRESSURE: 72 MMHG | HEART RATE: 92 BPM | BODY MASS INDEX: 27.1 KG/M2

## 2023-09-27 DIAGNOSIS — C73 PAPILLARY THYROID CARCINOMA (H): ICD-10-CM

## 2023-09-27 DIAGNOSIS — E89.0 POSTSURGICAL HYPOTHYROIDISM: ICD-10-CM

## 2023-09-27 PROCEDURE — 99214 OFFICE O/P EST MOD 30 MIN: CPT | Performed by: INTERNAL MEDICINE

## 2023-09-27 RX ORDER — LEVOTHYROXINE SODIUM 125 UG/1
125 TABLET ORAL DAILY
Qty: 90 TABLET | Refills: 3 | Status: SHIPPED | OUTPATIENT
Start: 2023-09-27 | End: 2024-09-25 | Stop reason: DRUGHIGH

## 2023-09-27 NOTE — LETTER
9/27/2023         RE: Waldemar Chua  [address] [address]        Dear Colleague,    Thank you for referring your patient, Waldemar Chua, to the [facility]. Please see a copy of my visit note below.      ENDOCRINOLOGY FOLLOW-UP        HISTORY OF PRESENT ILLNESS    Waldemar Chua is seen in follow-up.    1.  Papillary thyroid carcinoma.    No change in the feel of his neck.  No dysphagia, hoarseness, or change in breathing.    Ultrasound of the neck performed on 10/6/2022 showed a new 1.3 x 0.5 x 1.4 cm lymph node in left level 2, demonstrating normal morphology.    Thyroglobulin panel sent to [facility] on 9/28/2022 showed Thyroglobulin antibody negative (less than 0.4 U/mL) and thyroglobulin level undetectable at less than 0.1 ng/mL.    2.  Hypothyroidism.  We adjusted levothyroxine dose down to 125 mcg daily at the time of last visit in 9/2022.  Mr. Chua confirms he continues on this dose.   from coffee about half of the time he takes levothyroxine.    Feels generally well.  Bowel movements are regular, no tremors or palpitations.    Pertinent endocrine and related history:  1.  Papillary thyroid carcinoma.  -Previously followed by Dr. Basurto and prior to that followed by Dr. Kunz and Dr. Hadley.  -He does not have history of excessive head or neck radiation exposure.  No family history of thyroid cancer.  -Incidentally discovered to have neck mass on physical exam in 2010.  FNA of the mass, which was found to be a lymph node, was positive for papillary thyroid carcinoma.  He underwent total thyroidectomy with central and right modified neck dissection on 5/21/2010 at [facility].   -Surgical pathology showed the following:  ~Right lobe papillary thyroid carcinoma, classical type, 1.2 cm in greatest dimension, unifocal and well differentiated.  Margins negative for tumor, 0.1 mm to closest inked margin.  Tumor partially encapsulated.   No capsular, lymphovascular, perineural invasion identified.  No extrathyroidal extension.  ~Left lobe of thyroid had benign tissue.  ~6 out of 38 lymph nodes examined were positive.  No extranodal extension identified.  -The patient received 96.5 mCi of I-131 in 7/2010.  Posttreatment scan showed uptake in the neck only.  -PET scan was performed in 2011 due to nationwide shortage of Thyrogen.  PET scan showed no evidence of abnormal activity.  -Thyroglobulin antibodies have been negative and thyroglobulin level low.  -Imaging has previously revealed an abnormal appearing lymph node: He had a left neck lymph node FNA on 5/15/2012: Mixed lymphoid infiltrate was noted on cytology, with no evidence of malignancy.  -Neck ultrasound on 2/4/2020 showed 2 small lymph nodes in the right neck, at level IV 0.9 x 0.4 x 0.8 cm lymph node and at level V a 0.9 x 0.4 x 0.7 lymph node, without clearly suspicious features.  -Neck ultrasound on 6/10/2021 showed no neck mass and no suspicious lymphadenopathy, although images were limited to the thyroid bed.  2.  Postsurgical hypothyroidism.    Medical history also includes type 2 diabetes and hyperlipidemia.    Pertinent Social History: , has 4 children and 3 grandchildren; lives in [place].  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    PAST MEDICAL HISTORY  Past Medical History:   Diagnosis Date     Benign prostatic hyperplasia with urinary frequency 7/30/2021     Chronic hoarseness 2012    Atypical reflux suspected     Chronic right shoulder pain 7/24/2020    Suspect rotator cuff injury.     Daytime hypersomnolence 3/29/2019     Dizziness 1/23/2021     Eczema 6/21/2016     Family history of colonic polyps 3/29/2019     Family history of melanoma 7/24/2020     Hyperlipidemia      Memory changes 1/4/2018     Papillary thyroid carcinoma (H)     PET scan normal 2011, followed by endocrinology, FNA 2012 of inflammatory lymph node      Personal history of colonic polyps 7/24/2020    Colonoscopy May 2019 with removal of polyps.  Repeat in 5 years     Secondary hypothyroidism     Thyroidectomy     Thyroid disease      Type 2 diabetes mellitus without complication, without long-term current use of insulin (H) 3/29/2019     Vitamin D deficiency 3/29/2019       MEDICATIONS  Current Outpatient Medications   Medication Sig Dispense Refill     aspirin 81 MG EC tablet Take 81 mg by mouth       cholecalciferol 25 MCG (1000 UT) TABS Take 2,000 Units by mouth       levothyroxine (SYNTHROID/LEVOTHROID) 125 MCG tablet Take 1 tablet (125 mcg) by mouth daily 90 tablet 1     metFORMIN (GLUCOPHAGE XR) 500 MG 24 hr tablet TAKE 2 TABLETS(1000 MG) BY MOUTH DAILY 180 tablet 1     rosuvastatin (CRESTOR) 10 MG tablet TAKE 1 TABLET(10 MG) BY MOUTH DAILY 90 tablet 3     sitagliptin (JANUVIA) 100 MG tablet Take 1 tablet (100 mg) by mouth daily 90 tablet 3       Allergies, family, and social history were reviewed and documented as needed in EHR.     REVIEW OF SYSTEMS  A focused ROS was performed, with pertinent positives and negatives as noted in the HPI.    PHYSICAL EXAM  /72 (BP Location: Right arm, Patient Position: Sitting, Cuff Size: Adult Regular)   Pulse 92   Wt 80.2 kg (176 lb 14.4 oz)   BMI 27.10 kg/m    Body mass index is 27.1 kg/m .  Constitutional: Vital signs reviewed, as recorded above. Patient is alert, oriented and appears in no acute distress.  Eyes: PER, EOMI, no stare, lid lag, or retraction; no conjunctival injection.  Neck: Neck supple, no palpable thyroid tissue or neck masses.  Lymphatic: No cervical or supraclavicular LAD.  Cardiovascular: RRR, normal S1/S2, no audible murmurs, rubs or gallops; no LE edema.  Respiratory: CTAB, without wheezes, crackles or rhonchi; normal chest wall motion and respiratory effort.  MSK: No clubbing or cyanosis; normal muscle bulk and tone.  Skin: Normal skin color, temperature, turgor and  texture.  Neurological: Alert and oriented times 3. No tremor.    DATA REVIEW  Each of the following laboratory and/or imaging studies were reviewed.    TG panel to [facility] as outlined in HPI.    Component      Latest Ref Rng 9/28/2022  9:36 AM   TSH      0.30 - 4.20 uIU/mL 0.18 (L)    See Scanned Result THYROGLOBULIN AND ANTIBODY (SENDOUT TO [facility])-Scanned    T4 Free      0.90 - 1.70 ng/dL 1.85 (H)      Component      Latest Ref Rng 8/14/2023  9:24 AM   TSH      0.30 - 4.20 uIU/mL 0.66    See Scanned Result    T4 Free      0.90 - 1.70 ng/dL       Legend:  (L) Low  (H) High    Exam: US HEAD NECK SOFT TISSUE, 10/6/2022 1:10 PM     Indication: Papillary thyroid cancer, please completed detailed neck  ultrasound of cervical nodes level I-VI, to assess for  suspicious/abnormal cervical lymph nodes; Papillary thyroid carcinoma  (H); Postsurgical hypothyroidism     Comparison: None     Findings:   Focus grayscale and color Doppler sonographic evaluation of the  cervical lymph node chains bilaterally.     Right:  No abnormally enlarged lymph nodes by size criteria.     Left:  1.3 x 0.5 x 1.4 cm lymph node in the left level 2 area is new from  prior, lentiform in shape and with a fatty hilum.                                                                       Impression:   New 1.3 x 0.5 x 1.4 cm lymph node in the left level 2 chain is new  from prior however demonstrates normal morphology. Attention on  follow-up.     I have personally reviewed the examination and initial interpretation  and I agree with the findings.    ASSESSMENT  1.  Papillary thyroid carcinoma.  Classical type, T1N1M0, status post total thyroidectomy, central and modified right neck lymph node dissection, and radioiodine therapy without distant metastatic disease noted on posttreatment whole-body scan.  Thus far, has had good response to therapy--last thyroglobulin panel in 9/2022 showed undetectable thyroglobulin level with negative antibodies, ultrasound  showed prominent lymph node without suspicious features.  Check follow-up thyroglobulin level with next lab draw, check follow-up neck ultrasound at patient's convenience since he did not have a chance to complete this prior to this visit.  If the studies remain stable, would reduce frequency of surveillance testing.  Continue thyroid hormone suppression as below.      2.  Hypothyroidism.  Postsurgical.  In low risk group for persistent/recurrent disease based on data we have so far.  Aim for TSH between lower limit of normal and 2.  Previsit labs show TSH is in ideal range: Continue levothyroxine without changes.    3.  Diabetes mellitus.  Well-controlled on current regimen, deferred to PCP.    PLAN  -Labs in November with labs for Dr. Francis  -Continue levothyroxine 125 mcg daily  -[place] [facility] [place]on [address] at convenience  -Return for a follow-up visit in 1 year (sooner follow-up if needed based on results)--we will reduce frequency of surveillance testing by thyroglobulin panel and neck ultrasound if above results are stable/acceptable  -We will communicate results via [facility], or if needed by phone    Orders Placed This Encounter   Procedures     TSH with free T4 reflex     Thyroglobulin and Antibody (Sendout to [facility])       I spent a total of 30 minutes on the date of encounter reviewing medical records, evaluating the patient, coordinating care and documenting in the EHR, as detailed above.      Jer Kearney MD   Division of Diabetes, Endocrinology and Metabolism  Department of Medicine      cc: Wiley Francis MD             Again, thank you for allowing me to participate in the care of your patient.        Sincerely,        JER Kearney MD

## 2023-09-27 NOTE — PROGRESS NOTES
ENDOCRINOLOGY FOLLOW-UP        HISTORY OF PRESENT ILLNESS    Waldemar Chua is seen in follow-up.    1.  Papillary thyroid carcinoma.    No change in the feel of his neck.  No dysphagia, hoarseness, or change in breathing.    Ultrasound of the neck performed on 10/6/2022 showed a new 1.3 x 0.5 x 1.4 cm lymph node in left level 2, demonstrating normal morphology.    Thyroglobulin panel sent to [facility] on 9/28/2022 showed Thyroglobulin antibody negative (less than 0.4 U/mL) and thyroglobulin level undetectable at less than 0.1 ng/mL.    2.  Hypothyroidism.  We adjusted levothyroxine dose down to 125 mcg daily at the time of last visit in 9/2022.  Mr. Chua confirms he continues on this dose.   from coffee about half of the time he takes levothyroxine.    Feels generally well.  Bowel movements are regular, no tremors or palpitations.    Pertinent endocrine and related history:  1.  Papillary thyroid carcinoma.  -Previously followed by Dr. Basurto and prior to that followed by Dr. Kunz and Dr. Hadley.  -He does not have history of excessive head or neck radiation exposure.  No family history of thyroid cancer.  -Incidentally discovered to have neck mass on physical exam in 2010.  FNA of the mass, which was found to be a lymph node, was positive for papillary thyroid carcinoma.  He underwent total thyroidectomy with central and right modified neck dissection on 5/21/2010 at [facility].   -Surgical pathology showed the following:  ~Right lobe papillary thyroid carcinoma, classical type, 1.2 cm in greatest dimension, unifocal and well differentiated.  Margins negative for tumor, 0.1 mm to closest inked margin.  Tumor partially encapsulated.  No capsular, lymphovascular, perineural invasion identified.  No extrathyroidal extension.  ~Left lobe of thyroid had benign tissue.  ~6 out of 38 lymph nodes examined were positive.  No extranodal extension identified.  -The patient received 96.5 mCi of  I-131 in 7/2010.  Posttreatment scan showed uptake in the neck only.  -PET scan was performed in 2011 due to nationwide shortage of Thyrogen.  PET scan showed no evidence of abnormal activity.  -Thyroglobulin antibodies have been negative and thyroglobulin level low.  -Imaging has previously revealed an abnormal appearing lymph node: He had a left neck lymph node FNA on 5/15/2012: Mixed lymphoid infiltrate was noted on cytology, with no evidence of malignancy.  -Neck ultrasound on 2/4/2020 showed 2 small lymph nodes in the right neck, at level IV 0.9 x 0.4 x 0.8 cm lymph node and at level V a 0.9 x 0.4 x 0.7 lymph node, without clearly suspicious features.  -Neck ultrasound on 6/10/2021 showed no neck mass and no suspicious lymphadenopathy, although images were limited to the thyroid bed.  2.  Postsurgical hypothyroidism.    Medical history also includes type 2 diabetes and hyperlipidemia.    Pertinent Social History: , has 4 children and 3 grandchildren; lives in [facility].  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    PAST MEDICAL HISTORY  Past Medical History:   Diagnosis Date    Benign prostatic hyperplasia with urinary frequency 7/30/2021    Chronic hoarseness 2012    Atypical reflux suspected    Chronic right shoulder pain 7/24/2020    Suspect rotator cuff injury.    Daytime hypersomnolence 3/29/2019    Dizziness 1/23/2021    Eczema 6/21/2016    Family history of colonic polyps 3/29/2019    Family history of melanoma 7/24/2020    Hyperlipidemia     Memory changes 1/4/2018    Papillary thyroid carcinoma (H)     PET scan normal 2011, followed by endocrinology, FNA 2012 of inflammatory lymph node    Personal history of colonic polyps 7/24/2020    Colonoscopy May 2019 with removal of polyps.  Repeat in 5 years    Secondary hypothyroidism     Thyroidectomy    Thyroid disease     Type 2 diabetes mellitus without complication, without long-term current use of insulin (H)  3/29/2019    Vitamin D deficiency 3/29/2019       MEDICATIONS  Current Outpatient Medications   Medication Sig Dispense Refill    aspirin 81 MG EC tablet Take 81 mg by mouth      cholecalciferol 25 MCG (1000 UT) TABS Take 2,000 Units by mouth      levothyroxine (SYNTHROID/LEVOTHROID) 125 MCG tablet Take 1 tablet (125 mcg) by mouth daily 90 tablet 1    metFORMIN (GLUCOPHAGE XR) 500 MG 24 hr tablet TAKE 2 TABLETS(1000 MG) BY MOUTH DAILY 180 tablet 1    rosuvastatin (CRESTOR) 10 MG tablet TAKE 1 TABLET(10 MG) BY MOUTH DAILY 90 tablet 3    sitagliptin (JANUVIA) 100 MG tablet Take 1 tablet (100 mg) by mouth daily 90 tablet 3       Allergies, family, and social history were reviewed and documented as needed in EHR.     REVIEW OF SYSTEMS  A focused ROS was performed, with pertinent positives and negatives as noted in the HPI.    PHYSICAL EXAM  /72 (BP Location: Right arm, Patient Position: Sitting, Cuff Size: Adult Regular)   Pulse 92   Wt 80.2 kg (176 lb 14.4 oz)   BMI 27.10 kg/m    Body mass index is 27.1 kg/m .  Constitutional: Vital signs reviewed, as recorded above. Patient is alert, oriented and appears in no acute distress.  Eyes: PER, EOMI, no stare, lid lag, or retraction; no conjunctival injection.  Neck: Neck supple, no palpable thyroid tissue or neck masses.  Lymphatic: No cervical or supraclavicular LAD.  Cardiovascular: RRR, normal S1/S2, no audible murmurs, rubs or gallops; no LE edema.  Respiratory: CTAB, without wheezes, crackles or rhonchi; normal chest wall motion and respiratory effort.  MSK: No clubbing or cyanosis; normal muscle bulk and tone.  Skin: Normal skin color, temperature, turgor and texture.  Neurological: Alert and oriented times 3. No tremor.    DATA REVIEW  Each of the following laboratory and/or imaging studies were reviewed.    TG panel to [facility] as outlined in HPI.    Component      Latest Ref Rng 9/28/2022  9:36 AM   TSH      0.30 - 4.20 uIU/mL 0.18 (L)    See Scanned Result  THYROGLOBULIN AND ANTIBODY (SENDOUT TO [facility])-Scanned    T4 Free      0.90 - 1.70 ng/dL 1.85 (H)      Component      Latest Ref Rng 8/14/2023  9:24 AM   TSH      0.30 - 4.20 uIU/mL 0.66    See Scanned Result    T4 Free      0.90 - 1.70 ng/dL       Legend:  (L) Low  (H) High    Exam: US HEAD NECK SOFT TISSUE, 10/6/2022 1:10 PM     Indication: Papillary thyroid cancer, please completed detailed neck  ultrasound of cervical nodes level I-VI, to assess for  suspicious/abnormal cervical lymph nodes; Papillary thyroid carcinoma  (H); Postsurgical hypothyroidism     Comparison: None     Findings:   Focus grayscale and color Doppler sonographic evaluation of the  cervical lymph node chains bilaterally.     Right:  No abnormally enlarged lymph nodes by size criteria.     Left:  1.3 x 0.5 x 1.4 cm lymph node in the left level 2 area is new from  prior, lentiform in shape and with a fatty hilum.                                                                       Impression:   New 1.3 x 0.5 x 1.4 cm lymph node in the left level 2 chain is new  from prior however demonstrates normal morphology. Attention on  follow-up.     I have personally reviewed the examination and initial interpretation  and I agree with the findings.    ASSESSMENT  1.  Papillary thyroid carcinoma.  Classical type, T1N1M0, status post total thyroidectomy, central and modified right neck lymph node dissection, and radioiodine therapy without distant metastatic disease noted on posttreatment whole-body scan.  Thus far, has had good response to therapy--last thyroglobulin panel in 9/2022 showed undetectable thyroglobulin level with negative antibodies, ultrasound showed prominent lymph node without suspicious features.  Check follow-up thyroglobulin level with next lab draw, check follow-up neck ultrasound at patient's convenience since he did not have a chance to complete this prior to this visit.  If the studies remain stable, would reduce frequency of  surveillance testing.  Continue thyroid hormone suppression as below.      2.  Hypothyroidism.  Postsurgical.  In low risk group for persistent/recurrent disease based on data we have so far.  Aim for TSH between lower limit of normal and 2.  Previsit labs show TSH is in ideal range: Continue levothyroxine without changes.    3.  Diabetes mellitus.  Well-controlled on current regimen, deferred to PCP.    PLAN  -Labs in November with labs for Dr. Francis  -Continue levothyroxine 125 mcg daily  -Schedule neck ultrasound at the [facility] in [place] ([place] [address]) at convenience  -Return for a follow-up visit in 1 year (sooner follow-up if needed based on results)--we will reduce frequency of surveillance testing by thyroglobulin panel and neck ultrasound if above results are stable/acceptable  -We will communicate results via [employer], or if needed by phone    Orders Placed This Encounter   Procedures    TSH with free T4 reflex    Thyroglobulin and Antibody (Sendout to [facility])       I spent a total of 30 minutes on the date of encounter reviewing medical records, evaluating the patient, coordinating care and documenting in the EHR, as detailed above.      Efe Kearney MD   Division of Diabetes, Endocrinology and Metabolism  Department of Medicine      cc: Wiley Francis MD

## 2023-09-27 NOTE — PATIENT INSTRUCTIONS
-Labs in November with labs for Dr. Francis  -Continue levothyroxine 125 mcg daily  -Schedule neck ultrasound at the [facility] in [place] (on [address]) at convenience  -Return for a follow-up visit in 1 year (sooner follow-up if needed based on results)  -We will communicate results via [employer], or if needed by phone

## 2023-10-05 ENCOUNTER — ANCILLARY PROCEDURE (OUTPATIENT)
Dept: ULTRASOUND IMAGING | Facility: CLINIC | Age: 63
End: 2023-10-05
Attending: INTERNAL MEDICINE
Payer: COMMERCIAL

## 2023-10-05 DIAGNOSIS — C73 PAPILLARY THYROID CARCINOMA (H): ICD-10-CM

## 2023-10-05 PROCEDURE — 76536 US EXAM OF HEAD AND NECK: CPT | Mod: GC | Performed by: RADIOLOGY

## 2023-11-08 ENCOUNTER — TRANSFERRED RECORDS (OUTPATIENT)
Dept: HEALTH INFORMATION MANAGEMENT | Facility: CLINIC | Age: 63
End: 2023-11-08
Payer: COMMERCIAL

## 2023-11-16 ENCOUNTER — LAB (OUTPATIENT)
Dept: LAB | Facility: CLINIC | Age: 63
End: 2023-11-16
Payer: COMMERCIAL

## 2023-11-16 ENCOUNTER — MYC MEDICAL ADVICE (OUTPATIENT)
Dept: INTERNAL MEDICINE | Facility: CLINIC | Age: 63
End: 2023-11-16

## 2023-11-16 DIAGNOSIS — E89.0 POSTSURGICAL HYPOTHYROIDISM: ICD-10-CM

## 2023-11-16 DIAGNOSIS — E11.9 TYPE 2 DIABETES MELLITUS WITHOUT COMPLICATION, WITHOUT LONG-TERM CURRENT USE OF INSULIN (H): Primary | ICD-10-CM

## 2023-11-16 DIAGNOSIS — C73 PAPILLARY THYROID CARCINOMA (H): ICD-10-CM

## 2023-11-16 DIAGNOSIS — E11.9 TYPE 2 DIABETES MELLITUS WITHOUT COMPLICATION, WITHOUT LONG-TERM CURRENT USE OF INSULIN (H): ICD-10-CM

## 2023-11-16 LAB
HBA1C MFR BLD: 7 % (ref 0–5.6)
TSH SERPL DL<=0.005 MIU/L-ACNC: 1.57 UIU/ML (ref 0.3–4.2)

## 2023-11-16 PROCEDURE — 83036 HEMOGLOBIN GLYCOSYLATED A1C: CPT

## 2023-11-16 PROCEDURE — 84443 ASSAY THYROID STIM HORMONE: CPT

## 2023-11-16 PROCEDURE — 84432 ASSAY OF THYROGLOBULIN: CPT | Mod: 90

## 2023-11-16 PROCEDURE — 86800 THYROGLOBULIN ANTIBODY: CPT | Mod: 90

## 2023-11-16 PROCEDURE — 36415 COLL VENOUS BLD VENIPUNCTURE: CPT

## 2023-11-16 PROCEDURE — 99000 SPECIMEN HANDLING OFFICE-LAB: CPT

## 2023-11-21 LAB — SCANNED LAB RESULT: NORMAL

## 2023-11-30 DIAGNOSIS — E89.0 POSTSURGICAL HYPOTHYROIDISM: Primary | ICD-10-CM

## 2024-01-15 ENCOUNTER — PATIENT OUTREACH (OUTPATIENT)
Dept: GASTROENTEROLOGY | Facility: CLINIC | Age: 64
End: 2024-01-15
Payer: COMMERCIAL

## 2024-02-08 DIAGNOSIS — E11.9 TYPE 2 DIABETES MELLITUS WITHOUT COMPLICATION, WITHOUT LONG-TERM CURRENT USE OF INSULIN (H): ICD-10-CM

## 2024-02-08 RX ORDER — METFORMIN HCL 500 MG
1000 TABLET, EXTENDED RELEASE 24 HR ORAL DAILY
Qty: 180 TABLET | Refills: 0 | Status: SHIPPED | OUTPATIENT
Start: 2024-02-08 | End: 2024-05-09

## 2024-02-28 ENCOUNTER — PATIENT OUTREACH (OUTPATIENT)
Dept: GASTROENTEROLOGY | Facility: CLINIC | Age: 64
End: 2024-02-28
Payer: COMMERCIAL

## 2024-02-28 ENCOUNTER — MYC MEDICAL ADVICE (OUTPATIENT)
Dept: GASTROENTEROLOGY | Facility: CLINIC | Age: 64
End: 2024-02-28
Payer: COMMERCIAL

## 2024-02-28 DIAGNOSIS — Z12.11 SPECIAL SCREENING FOR MALIGNANT NEOPLASMS, COLON: Primary | ICD-10-CM

## 2024-02-28 NOTE — TELEPHONE ENCOUNTER
"CRC Screening Colonoscopy Referral Review    Patient meets the inclusion criteria for screening colonoscopy standing order.    Ordering/Referring Provider:  Wiley Francis     BMI: Estimated body mass index is 27.1 kg/m  as calculated from the following:    Height as of 8/17/23: 1.721 m (5' 7.75\").    Weight as of 9/27/23: 80.2 kg (176 lb 14.4 oz).     Sedation:  Does patient have any of the following conditions affecting sedation?  No medical conditions affecting sedation.    Previous Scopes:  Any previous recommendations or follow up needs based on previous scope?  na / No recommendations.    Medical Concerns to Postpone Order:  Does patient have any of the following medical concerns that should postpone/delay colonoscopy referral?  No medical conditions affecting colonoscopy referral.    Final Referral Details:  Based on patient's medical history patient is appropriate for referral order with moderate sedation. If patient's BMI > 50 do not schedule in ASC.    Albina Anaya RN on 2/28/2024 at 8:04 AM    "

## 2024-05-09 ENCOUNTER — MYC REFILL (OUTPATIENT)
Dept: INTERNAL MEDICINE | Facility: CLINIC | Age: 64
End: 2024-05-09
Payer: COMMERCIAL

## 2024-05-09 DIAGNOSIS — E11.9 TYPE 2 DIABETES MELLITUS WITHOUT COMPLICATION, WITHOUT LONG-TERM CURRENT USE OF INSULIN (H): ICD-10-CM

## 2024-05-09 RX ORDER — METFORMIN HCL 500 MG
1000 TABLET, EXTENDED RELEASE 24 HR ORAL DAILY
Qty: 180 TABLET | Refills: 3 | Status: SHIPPED | OUTPATIENT
Start: 2024-05-09

## 2024-06-27 ENCOUNTER — MYC MEDICAL ADVICE (OUTPATIENT)
Dept: INTERNAL MEDICINE | Facility: CLINIC | Age: 64
End: 2024-06-27
Payer: COMMERCIAL

## 2024-07-14 ENCOUNTER — HEALTH MAINTENANCE LETTER (OUTPATIENT)
Age: 64
End: 2024-07-14

## 2024-08-06 DIAGNOSIS — E11.9 TYPE 2 DIABETES MELLITUS WITHOUT COMPLICATION, WITHOUT LONG-TERM CURRENT USE OF INSULIN (H): ICD-10-CM

## 2024-08-06 DIAGNOSIS — E78.5 HYPERLIPIDEMIA, UNSPECIFIED HYPERLIPIDEMIA TYPE: ICD-10-CM

## 2024-08-06 RX ORDER — SITAGLIPTIN 100 MG/1
100 TABLET, FILM COATED ORAL DAILY
Qty: 30 TABLET | Refills: 0 | Status: SHIPPED | OUTPATIENT
Start: 2024-08-06 | End: 2024-09-05

## 2024-08-06 RX ORDER — ROSUVASTATIN CALCIUM 10 MG/1
10 TABLET, COATED ORAL DAILY
Qty: 30 TABLET | Refills: 0 | Status: SHIPPED | OUTPATIENT
Start: 2024-08-06 | End: 2024-09-05

## 2024-08-09 ENCOUNTER — TRANSFERRED RECORDS (OUTPATIENT)
Dept: MULTI SPECIALTY CLINIC | Facility: CLINIC | Age: 64
End: 2024-08-09
Payer: COMMERCIAL

## 2024-08-09 LAB — RETINOPATHY: NORMAL

## 2024-08-27 ENCOUNTER — OFFICE VISIT (OUTPATIENT)
Dept: INTERNAL MEDICINE | Facility: CLINIC | Age: 64
End: 2024-08-27
Payer: MEDICARE

## 2024-08-27 VITALS
TEMPERATURE: 98.2 F | OXYGEN SATURATION: 98 % | RESPIRATION RATE: 16 BRPM | SYSTOLIC BLOOD PRESSURE: 138 MMHG | HEART RATE: 62 BPM | BODY MASS INDEX: 26.05 KG/M2 | DIASTOLIC BLOOD PRESSURE: 74 MMHG | HEIGHT: 68 IN | WEIGHT: 171.9 LBS

## 2024-08-27 DIAGNOSIS — Z86.0100 PERSONAL HISTORY OF COLONIC POLYPS: ICD-10-CM

## 2024-08-27 DIAGNOSIS — N40.1 BENIGN PROSTATIC HYPERPLASIA WITH URINARY FREQUENCY: ICD-10-CM

## 2024-08-27 DIAGNOSIS — Z00.00 WELCOME TO MEDICARE PREVENTIVE VISIT: Primary | ICD-10-CM

## 2024-08-27 DIAGNOSIS — R03.0 ELEVATED BLOOD PRESSURE READING WITHOUT DIAGNOSIS OF HYPERTENSION: ICD-10-CM

## 2024-08-27 DIAGNOSIS — E55.9 VITAMIN D DEFICIENCY: ICD-10-CM

## 2024-08-27 DIAGNOSIS — Z51.81 ENCOUNTER FOR THERAPEUTIC DRUG MONITORING: ICD-10-CM

## 2024-08-27 DIAGNOSIS — Z12.5 SCREENING FOR PROSTATE CANCER: ICD-10-CM

## 2024-08-27 DIAGNOSIS — E03.8 SECONDARY HYPOTHYROIDISM: ICD-10-CM

## 2024-08-27 DIAGNOSIS — E78.5 HYPERLIPIDEMIA, UNSPECIFIED HYPERLIPIDEMIA TYPE: ICD-10-CM

## 2024-08-27 DIAGNOSIS — R35.0 BENIGN PROSTATIC HYPERPLASIA WITH URINARY FREQUENCY: ICD-10-CM

## 2024-08-27 DIAGNOSIS — E11.9 TYPE 2 DIABETES MELLITUS WITHOUT COMPLICATION, WITHOUT LONG-TERM CURRENT USE OF INSULIN (H): ICD-10-CM

## 2024-08-27 DIAGNOSIS — Z80.8 FAMILY HISTORY OF MELANOMA: ICD-10-CM

## 2024-08-27 DIAGNOSIS — E89.0 POSTSURGICAL HYPOTHYROIDISM: ICD-10-CM

## 2024-08-27 DIAGNOSIS — C73 PAPILLARY THYROID CARCINOMA (H): ICD-10-CM

## 2024-08-27 LAB
ALBUMIN SERPL BCG-MCNC: 4.7 G/DL (ref 3.5–5.2)
ALBUMIN UR-MCNC: NEGATIVE MG/DL
ALP SERPL-CCNC: 59 U/L (ref 40–150)
ALT SERPL W P-5'-P-CCNC: 17 U/L (ref 0–70)
ANION GAP SERPL CALCULATED.3IONS-SCNC: 12 MMOL/L (ref 7–15)
APPEARANCE UR: CLEAR
AST SERPL W P-5'-P-CCNC: 20 U/L (ref 0–45)
BILIRUB SERPL-MCNC: 0.6 MG/DL
BILIRUB UR QL STRIP: NEGATIVE
BUN SERPL-MCNC: 18.8 MG/DL (ref 8–23)
CALCIUM SERPL-MCNC: 9.4 MG/DL (ref 8.8–10.4)
CHLORIDE SERPL-SCNC: 102 MMOL/L (ref 98–107)
CHOLEST SERPL-MCNC: 182 MG/DL
COLOR UR AUTO: NORMAL
CREAT SERPL-MCNC: 0.92 MG/DL (ref 0.67–1.17)
CREAT UR-MCNC: 82 MG/DL
EGFRCR SERPLBLD CKD-EPI 2021: >90 ML/MIN/1.73M2
ERYTHROCYTE [DISTWIDTH] IN BLOOD BY AUTOMATED COUNT: 12.9 % (ref 10–15)
FASTING STATUS PATIENT QL REPORTED: YES
FASTING STATUS PATIENT QL REPORTED: YES
GLUCOSE SERPL-MCNC: 142 MG/DL (ref 70–99)
GLUCOSE UR STRIP-MCNC: NEGATIVE MG/DL
HBA1C MFR BLD: 7 %
HCO3 SERPL-SCNC: 25 MMOL/L (ref 22–29)
HCT VFR BLD AUTO: 42.4 % (ref 40–53)
HDLC SERPL-MCNC: 60 MG/DL
HGB BLD-MCNC: 14.3 G/DL (ref 13.3–17.7)
HGB UR QL STRIP: NEGATIVE
KETONES UR STRIP-MCNC: NEGATIVE MG/DL
LDLC SERPL CALC-MCNC: 95 MG/DL
LEUKOCYTE ESTERASE UR QL STRIP: NEGATIVE
MCH RBC QN AUTO: 28.8 PG (ref 26.5–33)
MCHC RBC AUTO-ENTMCNC: 33.7 G/DL (ref 31.5–36.5)
MCV RBC AUTO: 86 FL (ref 78–100)
MICROALBUMIN UR-MCNC: <12 MG/L
MICROALBUMIN/CREAT UR: NORMAL MG/G{CREAT}
NITRATE UR QL: NEGATIVE
NONHDLC SERPL-MCNC: 122 MG/DL
PH UR STRIP: 5.5 [PH] (ref 5–7)
PLATELET # BLD AUTO: 199 10E3/UL (ref 150–450)
POTASSIUM SERPL-SCNC: 4.3 MMOL/L (ref 3.4–5.3)
PROT SERPL-MCNC: 7.1 G/DL (ref 6.4–8.3)
PSA SERPL DL<=0.01 NG/ML-MCNC: 1.92 NG/ML (ref 0–4.5)
RBC # BLD AUTO: 4.96 10E6/UL (ref 4.4–5.9)
SODIUM SERPL-SCNC: 139 MMOL/L (ref 135–145)
SP GR UR STRIP: 1.02 (ref 1–1.03)
T4 FREE SERPL-MCNC: 1.93 NG/DL (ref 0.9–1.7)
TRIGL SERPL-MCNC: 134 MG/DL
TSH SERPL DL<=0.005 MIU/L-ACNC: 0.14 UIU/ML (ref 0.3–4.2)
UROBILINOGEN UR STRIP-MCNC: <2 MG/DL
VIT D+METAB SERPL-MCNC: 51 NG/ML (ref 20–50)
WBC # BLD AUTO: 6.2 10E3/UL (ref 4–11)

## 2024-08-27 PROCEDURE — 99214 OFFICE O/P EST MOD 30 MIN: CPT | Mod: 25 | Performed by: INTERNAL MEDICINE

## 2024-08-27 PROCEDURE — 83036 HEMOGLOBIN GLYCOSYLATED A1C: CPT | Performed by: INTERNAL MEDICINE

## 2024-08-27 PROCEDURE — G0103 PSA SCREENING: HCPCS | Performed by: INTERNAL MEDICINE

## 2024-08-27 PROCEDURE — 99207 PR FOOT EXAM NO CHARGE: CPT | Performed by: INTERNAL MEDICINE

## 2024-08-27 PROCEDURE — 36415 COLL VENOUS BLD VENIPUNCTURE: CPT | Performed by: INTERNAL MEDICINE

## 2024-08-27 PROCEDURE — 81003 URINALYSIS AUTO W/O SCOPE: CPT | Performed by: INTERNAL MEDICINE

## 2024-08-27 PROCEDURE — 84443 ASSAY THYROID STIM HORMONE: CPT | Performed by: INTERNAL MEDICINE

## 2024-08-27 PROCEDURE — 80061 LIPID PANEL: CPT | Performed by: INTERNAL MEDICINE

## 2024-08-27 PROCEDURE — G0404 EKG TRACING FOR INITIAL PREV: HCPCS | Performed by: INTERNAL MEDICINE

## 2024-08-27 PROCEDURE — 80053 COMPREHEN METABOLIC PANEL: CPT | Performed by: INTERNAL MEDICINE

## 2024-08-27 PROCEDURE — 82570 ASSAY OF URINE CREATININE: CPT | Performed by: INTERNAL MEDICINE

## 2024-08-27 PROCEDURE — G0402 INITIAL PREVENTIVE EXAM: HCPCS | Performed by: INTERNAL MEDICINE

## 2024-08-27 PROCEDURE — 82043 UR ALBUMIN QUANTITATIVE: CPT | Performed by: INTERNAL MEDICINE

## 2024-08-27 PROCEDURE — 84439 ASSAY OF FREE THYROXINE: CPT | Performed by: INTERNAL MEDICINE

## 2024-08-27 PROCEDURE — 85027 COMPLETE CBC AUTOMATED: CPT | Performed by: INTERNAL MEDICINE

## 2024-08-27 PROCEDURE — G0405 EKG INTERPRET & REPORT PREVE: HCPCS | Mod: OFF | Performed by: STUDENT IN AN ORGANIZED HEALTH CARE EDUCATION/TRAINING PROGRAM

## 2024-08-27 PROCEDURE — 82306 VITAMIN D 25 HYDROXY: CPT | Performed by: INTERNAL MEDICINE

## 2024-08-27 SDOH — HEALTH STABILITY: PHYSICAL HEALTH: ON AVERAGE, HOW MANY DAYS PER WEEK DO YOU ENGAGE IN MODERATE TO STRENUOUS EXERCISE (LIKE A BRISK WALK)?: 3 DAYS

## 2024-08-27 ASSESSMENT — SOCIAL DETERMINANTS OF HEALTH (SDOH): HOW OFTEN DO YOU GET TOGETHER WITH FRIENDS OR RELATIVES?: ONCE A WEEK

## 2024-08-27 NOTE — PROGRESS NOTES
Preventive Care Visit  [facility]  Wiley Francis MD, Internal Medicine  Aug 27, 2024      Assessment & Plan     Welcome to Medicare preventive visit  Immunizations are reviewed and recommending Prevnar 20.  We also discussed getting flu shot and COVID-vaccine this fall.  Living will discussed.  Information provided.  Non-smoker.  Uses alcohol in moderation.  Regular exercise and good diet habits to maintain a healthy weight discussed.  Up to date with colonoscopies and this should be repeated every 5 years.  Prostate exam is performed and I will check a PSA for prostate cancer screening.  Dementia and depression screening completed.  He is getting an annual diabetic eye exam completed.  Seeing a dentist every 6 months recommended.  Skin exam performed and recommending regular use of sunblock.  He should see a dermatologist every year.  Hepatitis C antibody for screening was normal.    - EKG 12-lead, tracing only    Type 2 diabetes mellitus without complication, without long-term current use of insulin (H)  Last A1c was 7% improved from 7.4%.  He continues on Januvia and metformin.  Now taking 100 mg of Januvia.  He does not monitor his sugars at home and we will recheck an A1c today.  He has been able to lose 5 pounds over the last year and his weight is near goal with a BMI of 26.3.  Annual diabetic foot exam was completed today.  No peripheral neuropathy.  He is getting a diabetic eye exam completed annually.  He continues on aspirin and rosuvastatin and will obtain microalbumin.  - HEMOGLOBIN A1C; Future  - Albumin Random Urine Quantitative with Creat Ratio; Future  - Comprehensive metabolic panel (BMP + Alb, Alk Phos, ALT, AST, Total. Bili, TP); Future    Papillary thyroid carcinoma (H)  History of thyroidectomy.  Followed by endocrinology.    Secondary hypothyroidism  Monitor TSH while on levothyroxine  - TSH with free T4 reflex; Future    Elevated blood pressure reading without  "diagnosis of hypertension  Blood pressure is not at goal but he does enjoy salt with his food and got into quite a bit of sodium yesterday eating corn on the cob.  I am asking him to recheck his blood pressure weekly over the next month and to report if his blood pressure remains elevated.  Recommending that he restrict sodium to less than 2000 mg daily    Hyperlipidemia, unspecified hyperlipidemia type  Rechecking lipid profile taking rosuvastatin  - Lipid Profile (Chol, Trig, HDL, LDL calc); Future    Vitamin D deficiency  Recheck vitamin D level on replacement  - Vitamin D deficiency screening; Future    Family history of melanoma  He sees a dermatologist every year    Benign prostatic hyperplasia with urinary frequency  Symptoms are stable.  Exam is normal  - UA Macroscopic with reflex to Microscopic and Culture - Lab Collect; Future    Personal history of colonic polyps  Colonoscopy every 5 years although recently completed and inadequate prep, may need to return in 2 years.  Obtain that report    Encounter for therapeutic drug monitoring  Monitor CBC while on aspirin and LFTs while on statin  - CBC with platelets; Future  - Comprehensive metabolic panel (BMP + Alb, Alk Phos, ALT, AST, Total. Bili, TP); Future    Screening for prostate cancer  Exam completed and will check PSA annually for prostate cancer screening  - PSA, screen; Future    Patient has been advised of split billing requirements and indicates understanding: Yes        BMI  Estimated body mass index is 26.33 kg/m  as calculated from the following:    Height as of this encounter: 1.721 m (5' 7.76\").    Weight as of this encounter: 78 kg (171 lb 14.4 oz).       Counseling  Appropriate preventive services were addressed with this patient via screening, questionnaire, or discussion as appropriate for fall prevention, nutrition, physical activity, Tobacco-use cessation, social engagement, weight loss and cognition.  Checklist reviewing preventive " services available has been given to the patient.  Reviewed patient's diet, addressing concerns and/or questions.   He is at risk for lack of exercise and has been provided with information to increase physical activity for the benefit of his well-being.   The patient was instructed to see the dentist every 6 months.   The patient was provided with written information regarding signs of hearing loss.       Follow-up in 1 year for annual wellness visit    Lucian Hidalgo is a 64 year old, presenting for the following: Here for a welcome to Medicare visit and to follow-up medical problems including type 2 diabetes, papillary thyroid cancer with secondary hypothyroidism, hypercholesterolemia and other concerns.  See assessment and plan for details  Medicare Visit        8/27/2024     8:55 AM   Additional Questions   Roomed by SIDDHARTHA Polanco        Via the Health Maintenance questionnaire, the patient has reported the following services have been completed -Eye Exam: [facility] 2024-08-09, this information has been sent to the abstraction team.  Health Care Directive  Patient does not have a Health Care Directive or Living Will: Discussed advance care planning with patient; information given to patient to review.          8/27/2024   General Health   How would you rate your overall physical health? Good   Feel stress (tense, anxious, or unable to sleep) Not at all            8/27/2024   Nutrition   Diet: Diabetic            8/27/2024   Exercise   Days per week of moderate/strenous exercise 3 days            8/27/2024   Social Factors   Frequency of gathering with friends or relatives Once a week   Worry food won't last until get money to buy more No   Food not last or not have enough money for food? No   Do you have housing? (Housing is defined as stable permanent housing and does not include staying ouside in a car, in a tent, in an abandoned building, in an overnight shelter, or couch-surfing.) Yes   Are you worried  about losing your housing? No   Lack of transportation? No   Unable to get utilities (heat,electricity)? No            8/27/2024   Fall Risk   Fallen 2 or more times in the past year? No   Trouble with walking or balance? No             8/27/2024   Activities of Daily Living- Home Safety   Needs help with the following daily activites None of the above   Safety concerns in the home None of the above            8/27/2024   Dental   Dentist two times every year? (!) NO            8/27/2024   Hearing Screening   Hearing concerns? (!) I NEED TO ASK PEOPLE TO SPEAK UP OR REPEAT THEMSELVES.    (!) IT'S HARD TO FOLLOW A CONVERSATION IN A NOISY RESTAURANT OR CROWDED ROOM.       Multiple values from one day are sorted in reverse-chronological order         8/27/2024   Driving Risk Screening   Patient/family members have concerns about driving No            8/27/2024   General Alertness/Fatigue Screening   Have you been more tired than usual lately? No            8/27/2024   Urinary Incontinence Screening   Bothered by leaking urine in past 6 months No            8/27/2024   TB Screening   Were you born outside of the [place]? No            Today's PHQ-2 Score:       8/27/2024     8:19 AM   PHQ-2 ( 1999 [employer])   Q1: Little interest or pleasure in doing things 0   Q2: Feeling down, depressed or hopeless 0   PHQ-2 Score 0   Q1: Little interest or pleasure in doing things Not at all   Q2: Feeling down, depressed or hopeless Not at all   PHQ-2 Score 0           8/27/2024   Substance Use   Alcohol more than 3/day or more than 7/wk No   Do you have a current opioid prescription? No   How severe/bad is pain from 1 to 10? 0/10 (No Pain)   Do you use any other substances recreationally? No        Social History     Tobacco Use    Smoking status: Never     Passive exposure: Never    Smokeless tobacco: Never   Vaping Use    Vaping status: Never Used   Substance Use Topics    Alcohol use: Yes     Alcohol/week: 4.0 standard drinks of alcohol      Types: 4 Standard drinks or equivalent per week    Drug use: Never           8/27/2024   AAA Screening   Family history of Abdominal Aortic Aneurysm (AAA)? No      Last PSA:   Prostate Specific Antigen Screen   Date Value Ref Range Status   08/17/2023 1.87 0.00 - 4.50 ng/mL Final   07/30/2021 2.03 0.00 - 4.50 ug/L Final     ASCVD Risk   The 10-year ASCVD risk score (Sohail TORRES, et al., 2019) is: 21.8%    Values used to calculate the score:      Age: 64 years      Sex: Male      Is Non- : No      Diabetic: Yes      Tobacco smoker: No      Systolic Blood Pressure: 138 mmHg      Is BP treated: No      HDL Cholesterol: 62 mg/dL      Total Cholesterol: 182 mg/dL            Reviewed and updated as needed this visit by Provider                    Past Medical History:   Diagnosis Date    Benign prostatic hyperplasia with urinary frequency 7/30/2021    Chronic hoarseness 2012    Atypical reflux suspected    Chronic right shoulder pain 7/24/2020    Suspect rotator cuff injury.    Daytime hypersomnolence 3/29/2019    Dizziness 1/23/2021    Eczema 6/21/2016    Family history of colonic polyps 3/29/2019    Family history of melanoma 7/24/2020    Hyperlipidemia     Memory changes 1/4/2018    Papillary thyroid carcinoma (H)     PET scan normal 2011, followed by endocrinology, FNA 2012 of inflammatory lymph node    Personal history of colonic polyps 7/24/2020    Colonoscopy May 2019 with removal of polyps.  Repeat in 5 years    Secondary hypothyroidism     Thyroidectomy    Thyroid disease     Type 2 diabetes mellitus without complication, without long-term current use of insulin (H) 3/29/2019    Vitamin D deficiency 3/29/2019     Past Surgical History:   Procedure Laterality Date    FOOT SURGERY      THYROIDECTOMY  05/01/2010    with neck dissection for papillary thyroid cancer     Current providers sharing in care for this patient include:  Patient Care Team:  Wiley Francis MD as PCP -  "General (Internal Medicine)  Wiley Francis MD as Assigned PCP  Efe Kearney MD as Assigned Endocrinology Provider    The following health maintenance items are reviewed in Epic and correct as of today:  Health Maintenance   Topic Date Due    EYE EXAM  Never done    Pneumococcal Vaccine: 65+ Years (2 of 2 - PCV) 07/30/2022    COVID-19 Vaccine (6 - 2023-24 season) 01/24/2024    COLORECTAL CANCER SCREENING  05/08/2024    A1C  05/16/2024    BMP  08/14/2024    LIPID  08/14/2024    MICROALBUMIN  08/14/2024    DIABETIC FOOT EXAM  08/17/2024    ANNUAL REVIEW OF HM ORDERS  08/17/2024    MEDICARE ANNUAL WELLNESS VISIT  08/17/2024    INFLUENZA VACCINE (1) 09/01/2024    TSH W/FREE T4 REFLEX  11/16/2024    ADVANCE CARE PLANNING  07/24/2025    FALL RISK ASSESSMENT  08/27/2025    DTAP/TDAP/TD IMMUNIZATION (4 - Td or Tdap) 03/29/2029    HEPATITIS C SCREENING  Completed    PHQ-2 (once per calendar year)  Completed    ZOSTER IMMUNIZATION  Completed    RSV VACCINE (Pregnancy & 60+)  Completed    HPV IMMUNIZATION  Aged Out    MENINGITIS IMMUNIZATION  Aged Out    RSV MONOCLONAL ANTIBODY  Aged Out    HIV SCREENING  Discontinued         Review of Systems  Constitutional, HEENT, cardiovascular, pulmonary, GI, , musculoskeletal, neuro, skin, endocrine and psych systems are negative, except as otherwise noted.     Objective    Exam  /74 (BP Location: Right arm, Patient Position: Sitting, Cuff Size: Adult Regular)   Pulse 62   Temp 98.2  F (36.8  C) (Oral)   Resp 16   Ht 1.721 m (5' 7.76\")   Wt 78 kg (171 lb 14.4 oz)   SpO2 98%   BMI 26.33 kg/m     Estimated body mass index is 26.33 kg/m  as calculated from the following:    Height as of this encounter: 1.721 m (5' 7.76\").    Weight as of this encounter: 78 kg (171 lb 14.4 oz).    Physical Exam  EYES: Eyelids, conjunctiva, and sclera were normal. Pupils were normal. Cornea, iris, and lens were normal bilaterally.  HEAD, EARS, NOSE, MOUTH, AND THROAT: Head and face " were normal. TMs and external auditory canals are normal.  Oropharynx normal  NECK: Neck appearance was normal. There were no neck masses and the thyroid was not enlarged and no nodules are felt.  No lymphadenopathy.  RESPIRATORY: Breathing pattern was normal and the chest moved symmetrically.   Lung sounds were normal and there were no rales or wheezes.  CARDIOVASCULAR: Heart rate and rhythm were normal.  S1 and S2 were normal and there were no extra sounds or murmurs. Peripheral pulses in arms and legs were normal.  There was no peripheral edema.  No carotid bruits.  GASTROINTESTINAL:  Bowel sounds were present.   Palpation detected no tenderness, mass, or enlarged organs.   RECTAL/PROSTATE: No external lesions.  Sphincter tone normal.  No palpable rectal lesions.  Prostate normal size, smooth, nontender without nodules  MUSCULOSKELETAL: Skeletal configuration was normal and muscle mass was normal for age. Joint appearance was overall normal.  LYMPHATIC: There were no enlarged nodes.  SKIN/HAIR/NAILS: Skin color was normal.  There were no concerning skin lesions.  NEUROLOGIC: The patient was alert and oriented to person, place, time, and circumstance. Speech was normal. Cranial nerves were normal. Motor strength was normal for age. The patient was normally coordinated.  Sensation intact.  Diabetic foot exam: Good pedal pulses.  Normal monofilament exam and normal vibratory sensation  PSYCHIATRIC:  Mood and affect were normal and the patient had normal recent and remote memory. The patient's judgment and insight were normal.       EKG showing sinus rhythm, rate 65 with no significant ST or T wave abnormality        8/27/2024   Mini Cog   Clock Draw Score 2 Normal    2 Normal   3 Item Recall 2 objects recalled    3 objects recalled   Mini Cog Total Score 4    5       Multiple values from one day are sorted in reverse-chronological order         Vision Screen  Vision Screen Results: Pass      Signed Electronically  by: Wiley Francis MD

## 2024-08-27 NOTE — PATIENT INSTRUCTIONS
Patient Education   Preventive Care Advice   This is general advice given by our system to help you stay healthy. However, your care team may have specific advice just for you. Please talk to your care team about your preventive care needs.  Nutrition  Eat 5 or more servings of fruits and vegetables each day.  Try wheat bread, brown rice and whole grain pasta (instead of white bread, rice, and pasta).  Get enough calcium and vitamin D. Check the label on foods and aim for 100% of the RDA (recommended daily allowance).  Lifestyle  Exercise at least 150 minutes each week  (30 minutes a day, 5 days a week).  Do muscle strengthening activities 2 days a week. These help control your weight and prevent disease.  No smoking.  Wear sunscreen to prevent skin cancer.  Continue to see a dermatologist every year  Have a dental exam and cleaning every 6 months.  Annual diabetic eye exam  Yearly exams  See your health care team every year to talk about:  Any changes in your health.  Any medicines your care team has prescribed.  Preventive care, family planning, and ways to prevent chronic diseases.  Shots (vaccines)   COVID-19 shot: Get this shot when it's available this fall  Flu shot: Get a flu shot every year.  Tetanus shot: Get a tetanus shot every 10 years.  Recommending pneumonia vaccine, Prevnar 20  Shingles shot (for age 50 and up) completed  General health tests  Diabetes screening:  Cholesterol test: Annually  Hepatitis C: Get tested at least once in your life.  STIs (sexually transmitted infections)  Before age 24: Ask your care team if you should be screened for STIs.  After age 24: Get screened for STIs if you're at risk. You are at risk for STIs (including HIV) if:  You are sexually active with more than one person.  You don't use condoms every time.  You or a partner was diagnosed with a sexually transmitted infection.  If you are at risk for HIV, ask about PrEP medicine to prevent HIV.  Get tested for HIV at least  once in your life, whether you are at risk for HIV or not.  Cancer screening tests  Colon cancer screening: It is important to start screening for colon cancer at age 45.  Colonoscopy every 5 years  Prostate cancer screening test: Annual PSA  For informational purposes only. Not to replace the advice of your health care provider. Copyright   2023 [employer]. All rights reserved. Clinically reviewed by the [facility]. [employer] 930226 - REV 01/24.  Hearing Loss: Care Instructions  Overview     Hearing loss is a sudden or slow decrease in how well you hear. It can range from slight to profound. Permanent hearing loss can occur with aging. It also can happen when you are exposed long-term to loud noise. Examples include listening to loud music, riding motorcycles, or being around other loud machines.  Hearing loss can affect your work and home life. It can make you feel lonely or depressed. You may feel that you have lost your independence. But hearing aids and other devices can help you hear better and feel connected to others.  Follow-up care is a key part of your treatment and safety. Be sure to make and go to all appointments, and call your doctor if you are having problems. It's also a good idea to know your test results and keep a list of the medicines you take.  How can you care for yourself at home?  Avoid loud noises whenever possible. This helps keep your hearing from getting worse.  Always wear hearing protection around loud noises.  Wear a hearing aid as directed.  A professional can help you pick a hearing aid that will work best for you.  You can also get hearing aids over the counter for mild to moderate hearing loss.  Have hearing tests as your doctor suggests. They can show whether your hearing has changed. Your hearing aid may need to be adjusted.  Use other devices as needed. These may include:  Telephone amplifiers and hearing aids that can connect to a  "television, stereo, radio, or microphone.  Devices that use lights or vibrations. These alert you to the doorbell, a ringing telephone, or a baby monitor.  Television closed-captioning. This shows the words at the bottom of the screen. Most new TVs can do this.  TTY (text telephone). This lets you type messages back and forth on the telephone instead of talking or listening. These devices are also called TDD. When messages are typed on the keyboard, they are sent over the phone line to a receiving TTY. The message is shown on a monitor.  Use text messaging, social media, and email if it is hard for you to communicate by telephone.  Try to learn a listening technique called speechreading. It is not lipreading. You pay attention to people's gestures, expressions, posture, and tone of voice. These clues can help you understand what a person is saying. Face the person you are talking to, and have them face you. Make sure the lighting is good. You need to see the other person's face clearly.  Think about counseling if you need help to adjust to your hearing loss.  When should you call for help?  Watch closely for changes in your health, and be sure to contact your doctor if:    You think your hearing is getting worse.     You have new symptoms, such as dizziness or nausea.   Where can you learn more?  Go to https://www.[employer].net/patiented  Enter R798 in the search box to learn more about \"Hearing Loss: Care Instructions.\"  Current as of: September 27, 2023               Content Version: 14.0    7659-2433 [employer].   Care instructions adapted under license by your healthcare professional. If you have questions about a medical condition or this instruction, always ask your healthcare professional. [employer] disclaims any warranty or liability for your use of this information.         "

## 2024-08-28 LAB
ATRIAL RATE - MUSE: 65 BPM
DIASTOLIC BLOOD PRESSURE - MUSE: NORMAL MMHG
INTERPRETATION ECG - MUSE: NORMAL
P AXIS - MUSE: 50 DEGREES
PR INTERVAL - MUSE: 154 MS
QRS DURATION - MUSE: 90 MS
QT - MUSE: 400 MS
QTC - MUSE: 416 MS
R AXIS - MUSE: 59 DEGREES
SYSTOLIC BLOOD PRESSURE - MUSE: NORMAL MMHG
T AXIS - MUSE: 56 DEGREES
VENTRICULAR RATE- MUSE: 65 BPM

## 2024-09-03 ENCOUNTER — TELEPHONE (OUTPATIENT)
Dept: INTERNAL MEDICINE | Facility: CLINIC | Age: 64
End: 2024-09-03
Payer: COMMERCIAL

## 2024-09-03 NOTE — TELEPHONE ENCOUNTER
----- Message from Wiley Francis sent at 9/3/2024 12:35 PM CDT -----  Please contact patient who has not reviewed his results on [employer] including the message attached regarding his diabetes control

## 2024-09-03 NOTE — TELEPHONE ENCOUNTER
Patient returned called.   Provider message relayed (labs and starting new medication).    No further questions at this time.

## 2024-09-03 NOTE — TELEPHONE ENCOUNTER
Attempted to call patient. To relay PCP's message and assist in booking lab only appointment. No answer. LMTCB.

## 2024-09-05 DIAGNOSIS — E11.9 TYPE 2 DIABETES MELLITUS WITHOUT COMPLICATION, WITHOUT LONG-TERM CURRENT USE OF INSULIN (H): ICD-10-CM

## 2024-09-05 DIAGNOSIS — E78.5 HYPERLIPIDEMIA, UNSPECIFIED HYPERLIPIDEMIA TYPE: ICD-10-CM

## 2024-09-05 RX ORDER — SITAGLIPTIN 100 MG/1
100 TABLET, FILM COATED ORAL DAILY
Qty: 90 TABLET | Refills: 2 | Status: SHIPPED | OUTPATIENT
Start: 2024-09-05

## 2024-09-05 RX ORDER — ROSUVASTATIN CALCIUM 10 MG/1
10 TABLET, COATED ORAL DAILY
Qty: 90 TABLET | Refills: 2 | Status: SHIPPED | OUTPATIENT
Start: 2024-09-05

## 2024-09-24 ENCOUNTER — IMMUNIZATION (OUTPATIENT)
Dept: FAMILY MEDICINE | Facility: CLINIC | Age: 64
End: 2024-09-24
Payer: MEDICARE

## 2024-09-24 DIAGNOSIS — Z23 ENCOUNTER FOR IMMUNIZATION: Primary | ICD-10-CM

## 2024-09-24 PROCEDURE — 90480 ADMN SARSCOV2 VAC 1/ONLY CMP: CPT

## 2024-09-24 PROCEDURE — 90677 PCV20 VACCINE IM: CPT

## 2024-09-24 PROCEDURE — 91320 SARSCV2 VAC 30MCG TRS-SUC IM: CPT

## 2024-09-24 PROCEDURE — G0008 ADMIN INFLUENZA VIRUS VAC: HCPCS

## 2024-09-24 PROCEDURE — G0009 ADMIN PNEUMOCOCCAL VACCINE: HCPCS

## 2024-09-24 PROCEDURE — 90662 IIV NO PRSV INCREASED AG IM: CPT

## 2024-09-24 NOTE — PROGRESS NOTES
Prior to immunization administration, verified patients identity using patient s name and date of birth. Please see Immunization Activity for additional information.     Screening Questionnaire for Adult Immunization    Are you sick today?   No   Do you have allergies to medications, food, a vaccine component or latex?   No   Have you ever had a serious reaction after receiving a vaccination?   No   Do you have a long-term health problem with heart, lung, kidney, or metabolic disease (e.g., diabetes), asthma, a blood disorder, no spleen, complement component deficiency, a cochlear implant, or a spinal fluid leak?  Are you on long-term aspirin therapy?   No   Do you have cancer, leukemia, HIV/AIDS, or any other immune system problem?   No   Do you have a parent, brother, or sister with an immune system problem?   No   In the past 3 months, have you taken medications that affect  your immune system, such as prednisone, other steroids, or anticancer drugs; drugs for the treatment of rheumatoid arthritis, Crohn s disease, or psoriasis; or have you had radiation treatments?   No   Have you had a seizure, or a brain or other nervous system problem?   No   During the past year, have you received a transfusion of blood or blood    products, or been given immune (gamma) globulin or antiviral drug?   No   For women: Are you pregnant or is there a chance you could become       pregnant during the next month?   No   Have you received any vaccinations in the past 4 weeks?   No     Immunization questionnaire answers were all negative.    I have reviewed the following standing orders:   This patient is due and qualifies for the Covid-19 vaccine.     Click here for COVID-19 Standing Order    I have reviewed the vaccines inclusion and exclusion criteria; No concerns regarding eligibility.     This patient is due and qualifies for the Influenza vaccine.    Click here for Influenza Vaccine Standing Order    I have reviewed the  vaccines inclusion and exclusion criteria; No concerns regarding eligibility.       This patient is due and qualifies for the Pneumococcal vaccine.    Click here for Pneumococcal (Adult) Standing Order    I have reviewed the vaccines inclusion and exclusion criteria;No concerns regarding eligibility.     Patient instructed to remain in clinic for 15 minutes afterwards, and to report any adverse reactions.     Screening performed by Emanuel Fajardo CMA on 9/24/2024 at 1:19 PM.

## 2024-09-25 ENCOUNTER — OFFICE VISIT (OUTPATIENT)
Dept: ENDOCRINOLOGY | Facility: CLINIC | Age: 64
End: 2024-09-25
Payer: MEDICARE

## 2024-09-25 VITALS
SYSTOLIC BLOOD PRESSURE: 122 MMHG | WEIGHT: 166.3 LBS | BODY MASS INDEX: 25.47 KG/M2 | HEART RATE: 88 BPM | DIASTOLIC BLOOD PRESSURE: 74 MMHG

## 2024-09-25 DIAGNOSIS — E89.0 POSTSURGICAL HYPOTHYROIDISM: Primary | ICD-10-CM

## 2024-09-25 DIAGNOSIS — C73 PAPILLARY THYROID CARCINOMA (H): ICD-10-CM

## 2024-09-25 PROCEDURE — 99214 OFFICE O/P EST MOD 30 MIN: CPT | Performed by: INTERNAL MEDICINE

## 2024-09-25 RX ORDER — LEVOTHYROXINE SODIUM 112 UG/1
112 TABLET ORAL DAILY
Qty: 90 TABLET | Refills: 3 | Status: SHIPPED | OUTPATIENT
Start: 2024-09-25

## 2024-09-25 NOTE — PROGRESS NOTES
ENDOCRINOLOGY FOLLOW-UP        HISTORY OF PRESENT ILLNESS    Waldemar Chua is seen in follow-up.    1.  Papillary thyroid carcinoma.    Retired in the interim since last visit: Now has a home in [place] and will be spending the winter there.    Has been doing well in the interim since last visit: No change in the feel of his neck.      Ultrasound of the neck performed on 10/5/2023 revealed slight decrease in the size of the left level 2 lymph node (now 1.3 x 0.7 x 0.5 cm, previously 1.3 x 0.5 x 1.4 cm), with normal morphology of this node.  Remainder of lymph nodes appeared benign.    Previous ultrasound of the neck performed on 10/6/2022 had shown a new 1.3 x 0.5 x 1.4 cm lymph node in left level 2, demonstrating normal morphology.    Thyroglobulin panel sent to [facility]:  -11/16/2023: Thyroglobulin antibody negative (less than 0.4 U/mL) and thyroglobulin level undetectable at less than 0.1 ng/mL.  Concurrent TSH 1.57.  -9/28/2022: Thyroglobulin antibody negative (less than 0.4 U/mL) and thyroglobulin level undetectable at less than 0.1 ng/mL.  Concurrent TSH 0.18.    2.  Hypothyroidism.  We adjusted levothyroxine dose down to 125 mcg daily in 9/2022.  Mr. Chua confirms continues on this dose: He is taking it before breakfast and separate from coffee.    Energy has been good.  No tremors, no palpitations, no change in temperature tolerance.    He has lost 10 pounds in the interim since last visit in 9/2023.    Pertinent endocrine and related history:  1.  Papillary thyroid carcinoma.  -Previously followed by Dr. Basurto and prior to that followed by Dr. Kunz and Dr. Hadley.  -He does not have history of excessive head or neck radiation exposure.  No family history of thyroid cancer.  -Incidentally discovered to have neck mass on physical exam in 2010.  FNA of the mass, which was found to be a lymph node, was positive for papillary thyroid carcinoma.  He underwent total thyroidectomy with central and  right modified neck dissection on 5/21/2010 at [facility].   -Surgical pathology showed the following:  ~Right lobe papillary thyroid carcinoma, classical type, 1.2 cm in greatest dimension, unifocal and well differentiated.  Margins negative for tumor, 0.1 mm to closest inked margin.  Tumor partially encapsulated.  No capsular, lymphovascular, perineural invasion identified.  No extrathyroidal extension.  ~Left lobe of thyroid had benign tissue.  ~6 out of 38 lymph nodes examined were positive.  No extranodal extension identified.  -The patient received 96.5 mCi of I-131 in 7/2010.  Posttreatment scan showed uptake in the neck only.  -PET scan was performed in 2011 due to nationwide shortage of Thyrogen.  PET scan showed no evidence of abnormal activity.  -Thyroglobulin antibodies have been negative and thyroglobulin level low.  -Imaging has previously revealed an abnormal appearing lymph node: He had a left neck lymph node FNA on 5/15/2012: Mixed lymphoid infiltrate was noted on cytology, with no evidence of malignancy.  -Neck ultrasound on 2/4/2020 showed 2 small lymph nodes in the right neck, at level IV 0.9 x 0.4 x 0.8 cm lymph node and at level V a 0.9 x 0.4 x 0.7 lymph node, without clearly suspicious features.  -Neck ultrasound on 6/10/2021 showed no neck mass and no suspicious lymphadenopathy, although images were limited to the thyroid bed.  2.  Postsurgical hypothyroidism.    Medical history also includes type 2 diabetes and hyperlipidemia.    Pertinent Social History: , has 4 children and 3 grandchildren; lives in [facility].  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  Has a home in [place] and will be spending the winter there.    PAST MEDICAL HISTORY  Past Medical History:   Diagnosis Date    Benign prostatic hyperplasia with urinary frequency 07/30/2021    Chronic hoarseness 2012    Atypical reflux suspected    Chronic right shoulder pain 07/24/2020    Suspect rotator cuff  injury.    Daytime hypersomnolence 03/29/2019    Dizziness 01/23/2021    Eczema 06/21/2016    Elevated blood pressure reading without diagnosis of hypertension 08/27/2024    Family history of colonic polyps 03/29/2019    Family history of melanoma 07/24/2020    Hyperlipidemia     Memory changes 01/04/2018    Papillary thyroid carcinoma (H)     PET scan normal 2011, followed by endocrinology, FNA 2012 of inflammatory lymph node    Personal history of colonic polyps 07/24/2020    Colonoscopy May 2019 with removal of polyps.  Repeat in 5 years    Secondary hypothyroidism     Thyroidectomy    Thyroid disease     Type 2 diabetes mellitus without complication, without long-term current use of insulin (H) 03/29/2019    Vitamin D deficiency 03/29/2019       MEDICATIONS  Current Outpatient Medications   Medication Sig Dispense Refill    aspirin 81 MG EC tablet Take 81 mg by mouth      cholecalciferol 25 MCG (1000 UT) TABS Take 2,000 Units by mouth      empagliflozin (JARDIANCE) 10 MG TABS tablet Take 1 tablet (10 mg) by mouth daily. 90 tablet 3    levothyroxine (SYNTHROID/LEVOTHROID) 112 MCG tablet Take 1 tablet (112 mcg) by mouth daily. 90 tablet 3    metFORMIN (GLUCOPHAGE XR) 500 MG 24 hr tablet Take 2 tablets (1,000 mg) by mouth daily 180 tablet 3    rosuvastatin (CRESTOR) 10 MG tablet TAKE 1 TABLET(10 MG) BY MOUTH DAILY 90 tablet 2    sitagliptin (JANUVIA) 100 MG tablet TAKE 1 TABLET(100 MG) BY MOUTH DAILY 90 tablet 2       Allergies, family, and social history were reviewed and documented as needed in EHR.     REVIEW OF SYSTEMS  A focused ROS was performed, with pertinent positives and negatives as noted in the HPI.    PHYSICAL EXAM  /74 (BP Location: Right arm, Patient Position: Sitting, Cuff Size: Adult Regular)   Pulse 88   Wt 75.4 kg (166 lb 4.8 oz)   BMI 25.47 kg/m    Body mass index is 25.47 kg/m .  Constitutional: Vital signs reviewed, as recorded above. Patient is alert, oriented and appears in no acute  distress.  Eyes: PER, EOMI, no stare, lid lag, or retraction; no conjunctival injection.  Neck: Neck supple, no palpable thyroid tissue or neck masses.  Lymphatic: No cervical or supraclavicular LAD.  Cardiovascular: RRR, normal S1/S2, no audible murmurs, rubs or gallops.  Respiratory: CTAB, without wheezes, crackles or rhonchi; normal chest wall motion and respiratory effort.  MSK: No clubbing or cyanosis; normal muscle bulk and tone.  Skin: Normal skin color, temperature, turgor and texture.  Neurological: Alert and oriented times 3. No tremor.    DATA REVIEW  Each of the following laboratory and/or imaging studies were reviewed.    TG panel to [facility] as outlined in HPI.          ASSESSMENT  1.  Papillary thyroid carcinoma.  Classical type, T1N1M0, status post total thyroidectomy, central and modified right neck lymph node dissection, and radioiodine therapy without distant metastatic disease noted on posttreatment whole-body scan.  Thus far, has had good response to therapy--last thyroglobulin panel in 11/2023 showed undetectable thyroglobulin level with negative antibodies, neck ultrasound in 10/2023 showed no suspicious lymphadenopathy.  Given excellent biochemical and structural response to therapy, would reduce frequency of surveillance testing: Anticipate follow-up thyroglobulin level in 1 year, neck ultrasound in 2026.  Continue thyroid hormone suppression as below.      2.  Hypothyroidism.  Postsurgical.  In low risk group for persistent/recurrent disease based on data we have so far.  Aim for TSH between lower limit of normal and 2.  Previsit labs show TSH is low; he may be absorbing levothyroxine better now that he is  it from food and coffee, he has also had weight loss.  We will reduce levothyroxine dose and recheck thyroid function tests before he leaves for [place].    3.  Diabetes mellitus.  Deferred to PCP.    PLAN  -Reduce levothyroxine to 112 mcg daily  -Labs in 2 months  -Return for a  follow-up visit in 1 year, with labs before visit--we will check thyroglobulin panel and thyroid function test (have asked patient to contact me if any new concerns; we have the option to draw labs while he is in [place] if more immediate thyroid-related concerns arise)  -We will communicate results via [employer], or if needed by phone    Orders Placed This Encounter   Procedures    TSH with free T4 reflex       Efe Kearney MD   Division of Diabetes, Endocrinology and Metabolism  Department of Medicine

## 2024-09-25 NOTE — LETTER
9/25/2024      Waldemar Chua  [address] [facility] [address]      Dear Colleague,    Thank you for referring your patient, Waldemar Chua, to the [facility]. Please see a copy of my visit note below.      ENDOCRINOLOGY FOLLOW-UP        HISTORY OF PRESENT ILLNESS    Waldemar Chua is seen in follow-up.    1.  Papillary thyroid carcinoma.    Retired in the interim since last visit: Now has a home in [place] and will be spending the winter there.    Has been doing well in the interim since last visit: No change in the feel of his neck.      Ultrasound of the neck performed on 10/5/2023 revealed slight decrease in the size of the left level 2 lymph node (now 1.3 x 0.7 x 0.5 cm, previously 1.3 x 0.5 x 1.4 cm), with normal morphology of this node.  Remainder of lymph nodes appeared benign.    Previous ultrasound of the neck performed on 10/6/2022 had shown a new 1.3 x 0.5 x 1.4 cm lymph node in left level 2, demonstrating normal morphology.    Thyroglobulin panel sent to [facility]:  -11/16/2023: Thyroglobulin antibody negative (less than 0.4 U/mL) and thyroglobulin level undetectable at less than 0.1 ng/mL.  Concurrent TSH 1.57.  -9/28/2022: Thyroglobulin antibody negative (less than 0.4 U/mL) and thyroglobulin level undetectable at less than 0.1 ng/mL.  Concurrent TSH 0.18.    2.  Hypothyroidism.  We adjusted levothyroxine dose down to 125 mcg daily in 9/2022.  Mr. Chua confirms continues on this dose: He is taking it before breakfast and separate from coffee.    Energy has been good.  No tremors, no palpitations, no change in temperature tolerance.    He has lost 10 pounds in the interim since last visit in 9/2023.    Pertinent endocrine and related history:  1.  Papillary thyroid carcinoma.  -Previously followed by Dr. Basurto and prior to that followed by Dr. Kunz and Dr. Hadley.  -He does not have history of excessive head or neck radiation exposure.  No family history  of thyroid cancer.  -Incidentally discovered to have neck mass on physical exam in 2010.  FNA of the mass, which was found to be a lymph node, was positive for papillary thyroid carcinoma.  He underwent total thyroidectomy with central and right modified neck dissection on 5/21/2010 at [facility].   -Surgical pathology showed the following:  ~Right lobe papillary thyroid carcinoma, classical type, 1.2 cm in greatest dimension, unifocal and well differentiated.  Margins negative for tumor, 0.1 mm to closest inked margin.  Tumor partially encapsulated.  No capsular, lymphovascular, perineural invasion identified.  No extrathyroidal extension.  ~Left lobe of thyroid had benign tissue.  ~6 out of 38 lymph nodes examined were positive.  No extranodal extension identified.  -The patient received 96.5 mCi of I-131 in 7/2010.  Posttreatment scan showed uptake in the neck only.  -PET scan was performed in 2011 due to nationwide shortage of Thyrogen.  PET scan showed no evidence of abnormal activity.  -Thyroglobulin antibodies have been negative and thyroglobulin level low.  -Imaging has previously revealed an abnormal appearing lymph node: He had a left neck lymph node FNA on 5/15/2012: Mixed lymphoid infiltrate was noted on cytology, with no evidence of malignancy.  -Neck ultrasound on 2/4/2020 showed 2 small lymph nodes in the right neck, at level IV 0.9 x 0.4 x 0.8 cm lymph node and at level V a 0.9 x 0.4 x 0.7 lymph node, without clearly suspicious features.  -Neck ultrasound on 6/10/2021 showed no neck mass and no suspicious lymphadenopathy, although images were limited to the thyroid bed.  2.  Postsurgical hypothyroidism.    Medical history also includes type 2 diabetes and hyperlipidemia.    Pertinent Social History: , has 4 children and 3 grandchildren; lives in [facility].  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  Has a home in [place] and will be spending the winter there.    PAST MEDICAL  HISTORY  Past Medical History:   Diagnosis Date     Benign prostatic hyperplasia with urinary frequency 07/30/2021     Chronic hoarseness 2012    Atypical reflux suspected     Chronic right shoulder pain 07/24/2020    Suspect rotator cuff injury.     Daytime hypersomnolence 03/29/2019     Dizziness 01/23/2021     Eczema 06/21/2016     Elevated blood pressure reading without diagnosis of hypertension 08/27/2024     Family history of colonic polyps 03/29/2019     Family history of melanoma 07/24/2020     Hyperlipidemia      Memory changes 01/04/2018     Papillary thyroid carcinoma (H)     PET scan normal 2011, followed by endocrinology, FNA 2012 of inflammatory lymph node     Personal history of colonic polyps 07/24/2020    Colonoscopy May 2019 with removal of polyps.  Repeat in 5 years     Secondary hypothyroidism     Thyroidectomy     Thyroid disease      Type 2 diabetes mellitus without complication, without long-term current use of insulin (H) 03/29/2019     Vitamin D deficiency 03/29/2019       MEDICATIONS  Current Outpatient Medications   Medication Sig Dispense Refill     aspirin 81 MG EC tablet Take 81 mg by mouth       cholecalciferol 25 MCG (1000 UT) TABS Take 2,000 Units by mouth       empagliflozin (JARDIANCE) 10 MG TABS tablet Take 1 tablet (10 mg) by mouth daily. 90 tablet 3     levothyroxine (SYNTHROID/LEVOTHROID) 112 MCG tablet Take 1 tablet (112 mcg) by mouth daily. 90 tablet 3     metFORMIN (GLUCOPHAGE XR) 500 MG 24 hr tablet Take 2 tablets (1,000 mg) by mouth daily 180 tablet 3     rosuvastatin (CRESTOR) 10 MG tablet TAKE 1 TABLET(10 MG) BY MOUTH DAILY 90 tablet 2     sitagliptin (JANUVIA) 100 MG tablet TAKE 1 TABLET(100 MG) BY MOUTH DAILY 90 tablet 2       Allergies, family, and social history were reviewed and documented as needed in EHR.     REVIEW OF SYSTEMS  A focused ROS was performed, with pertinent positives and negatives as noted in the HPI.    PHYSICAL EXAM  /74 (BP Location: Right  arm, Patient Position: Sitting, Cuff Size: Adult Regular)   Pulse 88   Wt 75.4 kg (166 lb 4.8 oz)   BMI 25.47 kg/m    Body mass index is 25.47 kg/m .  Constitutional: Vital signs reviewed, as recorded above. Patient is alert, oriented and appears in no acute distress.  Eyes: PER, EOMI, no stare, lid lag, or retraction; no conjunctival injection.  Neck: Neck supple, no palpable thyroid tissue or neck masses.  Lymphatic: No cervical or supraclavicular LAD.  Cardiovascular: RRR, normal S1/S2, no audible murmurs, rubs or gallops.  Respiratory: CTAB, without wheezes, crackles or rhonchi; normal chest wall motion and respiratory effort.  MSK: No clubbing or cyanosis; normal muscle bulk and tone.  Skin: Normal skin color, temperature, turgor and texture.  Neurological: Alert and oriented times 3. No tremor.    DATA REVIEW  Each of the following laboratory and/or imaging studies were reviewed.    TG panel to [facility] as outlined in HPI.          ASSESSMENT  1.  Papillary thyroid carcinoma.  Classical type, T1N1M0, status post total thyroidectomy, central and modified right neck lymph node dissection, and radioiodine therapy without distant metastatic disease noted on posttreatment whole-body scan.  Thus far, has had good response to therapy--last thyroglobulin panel in 11/2023 showed undetectable thyroglobulin level with negative antibodies, neck ultrasound in 10/2023 showed no suspicious lymphadenopathy.  Given excellent biochemical and structural response to therapy, would reduce frequency of surveillance testing: Anticipate follow-up thyroglobulin level in 1 year, neck ultrasound in 2026.  Continue thyroid hormone suppression as below.      2.  Hypothyroidism.  Postsurgical.  In low risk group for persistent/recurrent disease based on data we have so far.  Aim for TSH between lower limit of normal and 2.  Previsit labs show TSH is low; he may be absorbing levothyroxine better now that he is  it from food and  coffee, he has also had weight loss.  We will reduce levothyroxine dose and recheck thyroid function tests before he leaves for [place].    3.  Diabetes mellitus.  Deferred to PCP.    PLAN  -Reduce levothyroxine to 112 mcg daily  -Labs in 2 months  -Return for a follow-up visit in 1 year, with labs before visit--we will check thyroglobulin panel and thyroid function test (have asked patient to contact me if any new concerns; we have the option to draw labs while he is in [place] if more immediate thyroid-related concerns arise)  -We will communicate results via [employer], or if needed by phone    Orders Placed This Encounter   Procedures     TSH with free T4 reflex       Efe Kearney MD   Division of Diabetes, Endocrinology and Metabolism  Department of Medicine             Again, thank you for allowing me to participate in the care of your patient.        Sincerely,        Efe Kearney MD

## 2024-09-25 NOTE — PATIENT INSTRUCTIONS
-Reduce levothyroxine to 112 mcg daily  -Labs in 2 months  -Return for a follow-up visit in 1 year, with labs before visit   -We will communicate results via [employer], or if needed by phone

## 2024-11-26 ENCOUNTER — LAB (OUTPATIENT)
Dept: LAB | Facility: CLINIC | Age: 64
End: 2024-11-26
Payer: MEDICARE

## 2024-11-26 DIAGNOSIS — E11.9 TYPE 2 DIABETES MELLITUS WITHOUT COMPLICATION, WITHOUT LONG-TERM CURRENT USE OF INSULIN (H): ICD-10-CM

## 2024-11-26 DIAGNOSIS — E89.0 POSTSURGICAL HYPOTHYROIDISM: ICD-10-CM

## 2024-11-26 LAB
ANION GAP SERPL CALCULATED.3IONS-SCNC: 9 MMOL/L (ref 7–15)
BUN SERPL-MCNC: 20.6 MG/DL (ref 8–23)
CALCIUM SERPL-MCNC: 9.4 MG/DL (ref 8.8–10.4)
CHLORIDE SERPL-SCNC: 98 MMOL/L (ref 98–107)
CREAT SERPL-MCNC: 0.97 MG/DL (ref 0.67–1.17)
EGFRCR SERPLBLD CKD-EPI 2021: 87 ML/MIN/1.73M2
EST. AVERAGE GLUCOSE BLD GHB EST-MCNC: 140 MG/DL
GLUCOSE SERPL-MCNC: 109 MG/DL (ref 70–99)
HBA1C MFR BLD: 6.5 % (ref 0–5.6)
HCO3 SERPL-SCNC: 27 MMOL/L (ref 22–29)
POTASSIUM SERPL-SCNC: 4.5 MMOL/L (ref 3.4–5.3)
SODIUM SERPL-SCNC: 134 MMOL/L (ref 135–145)
TSH SERPL DL<=0.005 MIU/L-ACNC: 2.09 UIU/ML (ref 0.3–4.2)

## 2024-11-26 PROCEDURE — 36415 COLL VENOUS BLD VENIPUNCTURE: CPT

## 2024-11-26 PROCEDURE — 80048 BASIC METABOLIC PNL TOTAL CA: CPT

## 2024-11-26 PROCEDURE — 84443 ASSAY THYROID STIM HORMONE: CPT

## 2024-11-26 PROCEDURE — 83036 HEMOGLOBIN GLYCOSYLATED A1C: CPT

## 2024-12-05 ENCOUNTER — MYC MEDICAL ADVICE (OUTPATIENT)
Dept: ENDOCRINOLOGY | Facility: CLINIC | Age: 64
End: 2024-12-05
Payer: COMMERCIAL

## 2024-12-05 DIAGNOSIS — E89.0 POSTSURGICAL HYPOTHYROIDISM: Primary | ICD-10-CM

## 2025-05-08 ENCOUNTER — MYC MEDICAL ADVICE (OUTPATIENT)
Dept: INTERNAL MEDICINE | Facility: CLINIC | Age: 65
End: 2025-05-08
Payer: COMMERCIAL

## 2025-05-08 NOTE — TELEPHONE ENCOUNTER
Lab comment 11/26/24     Diabetes control has improved significantly since starting Jardiance.  I would continue it along with your other medications.

## 2025-05-08 NOTE — TELEPHONE ENCOUNTER
Please place order for A1c if appropriate. He is coming in for labs ordered by endocrinology  Milena Varela CMA      Last A1c was 11/26/24 and was 6.5.     Note to patient:    Diabetes control has improved significantly since starting Jardiance.  I would continue it along with your other medications.

## 2025-05-14 ENCOUNTER — LAB (OUTPATIENT)
Dept: LAB | Facility: CLINIC | Age: 65
End: 2025-05-14
Payer: MEDICARE

## 2025-05-14 DIAGNOSIS — E11.9 TYPE 2 DIABETES MELLITUS WITHOUT COMPLICATION, WITHOUT LONG-TERM CURRENT USE OF INSULIN (H): ICD-10-CM

## 2025-05-14 DIAGNOSIS — E89.0 POSTSURGICAL HYPOTHYROIDISM: ICD-10-CM

## 2025-05-14 LAB
EST. AVERAGE GLUCOSE BLD GHB EST-MCNC: 134 MG/DL
HBA1C MFR BLD: 6.3 % (ref 0–5.6)

## 2025-05-14 PROCEDURE — 84443 ASSAY THYROID STIM HORMONE: CPT

## 2025-05-14 PROCEDURE — 83036 HEMOGLOBIN GLYCOSYLATED A1C: CPT

## 2025-05-14 PROCEDURE — 36415 COLL VENOUS BLD VENIPUNCTURE: CPT

## 2025-05-15 LAB — TSH SERPL DL<=0.005 MIU/L-ACNC: 1.03 UIU/ML (ref 0.3–4.2)

## 2025-05-16 ENCOUNTER — RESULTS FOLLOW-UP (OUTPATIENT)
Dept: INTERNAL MEDICINE | Facility: CLINIC | Age: 65
End: 2025-05-16

## 2025-05-22 ENCOUNTER — MYC REFILL (OUTPATIENT)
Dept: INTERNAL MEDICINE | Facility: CLINIC | Age: 65
End: 2025-05-22
Payer: MEDICARE

## 2025-05-22 DIAGNOSIS — E11.9 TYPE 2 DIABETES MELLITUS WITHOUT COMPLICATION, WITHOUT LONG-TERM CURRENT USE OF INSULIN (H): ICD-10-CM

## 2025-05-22 RX ORDER — METFORMIN HYDROCHLORIDE 500 MG/1
1000 TABLET, EXTENDED RELEASE ORAL DAILY
Qty: 180 TABLET | Refills: 1 | Status: SHIPPED | OUTPATIENT
Start: 2025-05-22

## 2025-05-23 DIAGNOSIS — E11.9 TYPE 2 DIABETES MELLITUS WITHOUT COMPLICATION, WITHOUT LONG-TERM CURRENT USE OF INSULIN (H): ICD-10-CM

## 2025-05-27 DIAGNOSIS — E11.9 TYPE 2 DIABETES MELLITUS WITHOUT COMPLICATION, WITHOUT LONG-TERM CURRENT USE OF INSULIN (H): ICD-10-CM

## 2025-05-27 RX ORDER — EMPAGLIFLOZIN 10 MG/1
10 TABLET, FILM COATED ORAL DAILY
Qty: 90 TABLET | Refills: 3 | OUTPATIENT
Start: 2025-05-27

## 2025-05-29 ENCOUNTER — MYC MEDICAL ADVICE (OUTPATIENT)
Dept: ENDOCRINOLOGY | Facility: CLINIC | Age: 65
End: 2025-05-29
Payer: MEDICARE

## 2025-05-29 ENCOUNTER — RESULTS FOLLOW-UP (OUTPATIENT)
Dept: ENDOCRINOLOGY | Facility: CLINIC | Age: 65
End: 2025-05-29

## 2025-05-29 DIAGNOSIS — C73 PAPILLARY THYROID CARCINOMA (H): ICD-10-CM

## 2025-05-29 DIAGNOSIS — E89.0 POSTSURGICAL HYPOTHYROIDISM: Primary | ICD-10-CM

## 2025-06-11 ENCOUNTER — HOSPITAL ENCOUNTER (OUTPATIENT)
Dept: CT IMAGING | Facility: CLINIC | Age: 65
Discharge: HOME | End: 2025-06-11
Attending: INTERNAL MEDICINE
Payer: MEDICARE

## 2025-06-11 DIAGNOSIS — E78.5 HYPERLIPIDEMIA, UNSPECIFIED HYPERLIPIDEMIA TYPE: ICD-10-CM

## 2025-06-11 LAB
CV CALCIUM SCORE AGATSTON LM: 146
CV CALCIUM SCORING AGATSON LAD: 19
CV CALCIUM SCORING AGATSTON CX: 98
CV CALCIUM SCORING AGATSTON RCA: 139
CV CALCIUM SCORING AGATSTON TOTAL: 402

## 2025-06-11 PROCEDURE — 75571 CT HRT W/O DYE W/CA TEST: CPT | Mod: 26 | Performed by: STUDENT IN AN ORGANIZED HEALTH CARE EDUCATION/TRAINING PROGRAM

## 2025-06-11 PROCEDURE — 75571 CT HRT W/O DYE W/CA TEST: CPT

## 2025-06-12 ENCOUNTER — RESULTS FOLLOW-UP (OUTPATIENT)
Dept: INTERNAL MEDICINE | Facility: CLINIC | Age: 65
End: 2025-06-12

## 2025-06-12 DIAGNOSIS — E78.5 HYPERLIPIDEMIA, UNSPECIFIED HYPERLIPIDEMIA TYPE: Primary | ICD-10-CM

## 2025-06-12 DIAGNOSIS — Z51.81 ENCOUNTER FOR THERAPEUTIC DRUG MONITORING: ICD-10-CM

## 2025-06-12 DIAGNOSIS — R93.1 ELEVATED CORONARY ARTERY CALCIUM SCORE: ICD-10-CM

## 2025-06-12 PROBLEM — I77.810 MILD DILATION OF ASCENDING AORTA: Status: ACTIVE | Noted: 2025-06-12

## 2025-06-12 RX ORDER — ROSUVASTATIN CALCIUM 20 MG/1
20 TABLET, COATED ORAL DAILY
Qty: 90 TABLET | Refills: 3 | Status: SHIPPED | OUTPATIENT
Start: 2025-06-12

## 2025-06-23 ENCOUNTER — TRANSFERRED RECORDS (OUTPATIENT)
Dept: HEALTH INFORMATION MANAGEMENT | Facility: CLINIC | Age: 65
End: 2025-06-23
Payer: MEDICARE

## 2025-07-27 DIAGNOSIS — E11.9 TYPE 2 DIABETES MELLITUS WITHOUT COMPLICATION, WITHOUT LONG-TERM CURRENT USE OF INSULIN (H): ICD-10-CM

## 2025-07-28 RX ORDER — SITAGLIPTIN 100 MG/1
100 TABLET, FILM COATED ORAL DAILY
Qty: 90 TABLET | Refills: 0 | Status: SHIPPED | OUTPATIENT
Start: 2025-07-28